# Patient Record
Sex: FEMALE | Race: WHITE | Employment: OTHER | URBAN - NONMETROPOLITAN AREA
[De-identification: names, ages, dates, MRNs, and addresses within clinical notes are randomized per-mention and may not be internally consistent; named-entity substitution may affect disease eponyms.]

---

## 2017-01-09 ENCOUNTER — HOSPITAL ENCOUNTER (OUTPATIENT)
Dept: WOMENS IMAGING | Age: 62
Discharge: HOME | End: 2017-01-09
Payer: COMMERCIAL

## 2017-01-09 DIAGNOSIS — Z12.31 ENCOUNTER FOR SCREENING MAMMOGRAM FOR BREAST CANCER: ICD-10-CM

## 2017-01-09 DIAGNOSIS — Z78.0 POSTMENOPAUSAL: ICD-10-CM

## 2017-01-09 PROCEDURE — 77080 DXA BONE DENSITY AXIAL: CPT

## 2017-01-09 PROCEDURE — 77063 BREAST TOMOSYNTHESIS BI: CPT

## 2018-01-10 ENCOUNTER — HOSPITAL ENCOUNTER (OUTPATIENT)
Dept: WOMENS IMAGING | Age: 63
Discharge: HOME | End: 2018-01-10
Payer: COMMERCIAL

## 2018-01-10 DIAGNOSIS — Z12.39 BREAST CANCER SCREENING: ICD-10-CM

## 2018-01-10 PROCEDURE — 77063 BREAST TOMOSYNTHESIS BI: CPT

## 2018-03-26 ENCOUNTER — HOSPITAL ENCOUNTER (OUTPATIENT)
Dept: ULTRASOUND IMAGING | Facility: HOSPITAL | Age: 63
Discharge: HOME | End: 2018-03-26
Admitting: NURSE PRACTITIONER

## 2018-03-26 ENCOUNTER — TRANSCRIBE ORDERS (OUTPATIENT)
Dept: ADMINISTRATIVE | Facility: HOSPITAL | Age: 63
End: 2018-03-26

## 2018-03-26 DIAGNOSIS — I82.402 ACUTE EMBOLISM AND THOMBOS UNSP DEEP VEINS OF L LOW EXTREM (HCC): Primary | ICD-10-CM

## 2018-03-26 DIAGNOSIS — I82.402 ACUTE EMBOLISM AND THOMBOS UNSP DEEP VEINS OF L LOW EXTREM (HCC): ICD-10-CM

## 2018-03-26 PROCEDURE — 93971 EXTREMITY STUDY: CPT

## 2019-01-14 ENCOUNTER — HOSPITAL ENCOUNTER (OUTPATIENT)
Dept: WOMENS IMAGING | Age: 64
Discharge: HOME | End: 2019-01-14
Payer: COMMERCIAL

## 2019-01-14 DIAGNOSIS — Z12.31 ENCOUNTER FOR SCREENING MAMMOGRAM FOR MALIGNANT NEOPLASM OF BREAST: ICD-10-CM

## 2019-01-14 PROCEDURE — 77063 BREAST TOMOSYNTHESIS BI: CPT

## 2019-03-25 ENCOUNTER — TELEPHONE (OUTPATIENT)
Dept: GASTROENTEROLOGY | Age: 64
End: 2019-03-25

## 2019-05-15 ASSESSMENT — ENCOUNTER SYMPTOMS
SORE THROAT: 0
ABDOMINAL PAIN: 0
DIARRHEA: 0
COUGH: 0
BACK PAIN: 0
RECTAL PAIN: 0
VOMITING: 0
SHORTNESS OF BREATH: 0
ABDOMINAL DISTENTION: 0
NAUSEA: 0
VOICE CHANGE: 0
CHEST TIGHTNESS: 0
BLOOD IN STOOL: 0

## 2019-05-15 NOTE — PROGRESS NOTES
and light-headedness. Hematological: Negative for adenopathy. Does not bruise/bleed easily. All other systems reviewed and are negative. Objective:   Physical Exam   Constitutional: She is oriented to person, place, and time. She appears well-developed and well-nourished. No distress. /77   Pulse 59   Ht 5' 5\" (1.651 m)   Wt 135 lb (61.2 kg)   SpO2 98%   BMI 22.47 kg/m²      Eyes: Conjunctivae are normal. No scleral icterus. Neck: No tracheal deviation present. Cardiovascular: Normal rate and regular rhythm. Exam reveals no gallop and no friction rub. No murmur heard. Pulmonary/Chest: Effort normal and breath sounds normal. No respiratory distress. She has no wheezes. She has no rhonchi. She has no rales. Abdominal: Soft. Normal appearance and bowel sounds are normal. She exhibits no distension and no mass. There is no hepatomegaly. There is no tenderness. There is no rebound and no guarding. Musculoskeletal: She exhibits no edema. Neurological: She is alert and oriented to person, place, and time. She has normal strength. Skin: Skin is warm, dry and intact. No cyanosis. No pallor. Psychiatric: She has a normal mood and affect. Her behavior is normal. Thought content normal. Cognition and memory are normal.       Assessment:      1. Dysphagia, unspecified type    2. Heartburn    3. Screening for colon cancer          Plan:      Schedule colonoscopy and endoscopy     Instruct on bowel prep. Nothing to eat or drink after midnight the day of the exam.  Unable to drive for 24 hours after the procedure. No aspirin or nonsteroidal anti-inflammatories for 5 days before procedure. I have discussed the benefits, alternatives, and risks (including bleeding, perforation and death)  for pursuing Endoscopy (EGD/Colonscopy/EUS/ERCP) with the patient and they are willing to continue.  We also discussed the need for anesthesia, IV access, proper dietary changes, medication changes if necessary, and need for bowel prep (if ordered) prior to their Endoscopic procedure. They are aware they must have someone accompany them to their scheduled procedure to drive them home - they agree to the above and are willing to continue.       Plan for anesthesia: MAC  no reported complications

## 2019-05-20 ENCOUNTER — OFFICE VISIT (OUTPATIENT)
Dept: GASTROENTEROLOGY | Age: 64
End: 2019-05-20
Payer: MEDICARE

## 2019-05-20 VITALS
SYSTOLIC BLOOD PRESSURE: 130 MMHG | WEIGHT: 135 LBS | DIASTOLIC BLOOD PRESSURE: 77 MMHG | BODY MASS INDEX: 22.49 KG/M2 | HEART RATE: 59 BPM | OXYGEN SATURATION: 98 % | HEIGHT: 65 IN

## 2019-05-20 DIAGNOSIS — R12 HEARTBURN: ICD-10-CM

## 2019-05-20 DIAGNOSIS — R13.10 DYSPHAGIA, UNSPECIFIED TYPE: Primary | ICD-10-CM

## 2019-05-20 DIAGNOSIS — Z12.11 SCREENING FOR COLON CANCER: ICD-10-CM

## 2019-05-20 PROCEDURE — 1036F TOBACCO NON-USER: CPT | Performed by: NURSE PRACTITIONER

## 2019-05-20 PROCEDURE — G8420 CALC BMI NORM PARAMETERS: HCPCS | Performed by: NURSE PRACTITIONER

## 2019-05-20 PROCEDURE — 3017F COLORECTAL CA SCREEN DOC REV: CPT | Performed by: NURSE PRACTITIONER

## 2019-05-20 PROCEDURE — G8427 DOCREV CUR MEDS BY ELIG CLIN: HCPCS | Performed by: NURSE PRACTITIONER

## 2019-05-20 PROCEDURE — 99204 OFFICE O/P NEW MOD 45 MIN: CPT | Performed by: NURSE PRACTITIONER

## 2019-05-20 ASSESSMENT — ENCOUNTER SYMPTOMS: CONSTIPATION: 1

## 2019-09-13 ENCOUNTER — ANESTHESIA EVENT (OUTPATIENT)
Dept: OPERATING ROOM | Age: 64
End: 2019-09-13

## 2019-09-19 ENCOUNTER — APPOINTMENT (OUTPATIENT)
Dept: OPERATING ROOM | Age: 64
End: 2019-09-19

## 2019-09-19 ENCOUNTER — HOSPITAL ENCOUNTER (OUTPATIENT)
Age: 64
Setting detail: OUTPATIENT SURGERY
Discharge: HOME | End: 2019-09-19
Attending: INTERNAL MEDICINE | Admitting: INTERNAL MEDICINE
Payer: MEDICARE

## 2019-09-19 ENCOUNTER — HOSPITAL ENCOUNTER (OUTPATIENT)
Age: 64
Setting detail: SPECIMEN
Discharge: HOME | End: 2019-09-19
Payer: MEDICARE

## 2019-09-19 ENCOUNTER — ANESTHESIA (OUTPATIENT)
Dept: OPERATING ROOM | Age: 64
End: 2019-09-19

## 2019-09-19 VITALS — OXYGEN SATURATION: 91 % | SYSTOLIC BLOOD PRESSURE: 95 MMHG | DIASTOLIC BLOOD PRESSURE: 48 MMHG

## 2019-09-19 VITALS
SYSTOLIC BLOOD PRESSURE: 112 MMHG | OXYGEN SATURATION: 100 % | BODY MASS INDEX: 22.49 KG/M2 | TEMPERATURE: 97.9 F | HEART RATE: 47 BPM | RESPIRATION RATE: 16 BRPM | DIASTOLIC BLOOD PRESSURE: 66 MMHG | HEIGHT: 65 IN | WEIGHT: 135 LBS

## 2019-09-19 PROCEDURE — G8907 PT DOC NO EVENTS ON DISCHARG: HCPCS

## 2019-09-19 PROCEDURE — 45385 COLONOSCOPY W/LESION REMOVAL: CPT

## 2019-09-19 PROCEDURE — G8918 PT W/O PREOP ORDER IV AB PRO: HCPCS

## 2019-09-19 PROCEDURE — 88342 IMHCHEM/IMCYTCHM 1ST ANTB: CPT

## 2019-09-19 PROCEDURE — 43239 EGD BIOPSY SINGLE/MULTIPLE: CPT | Performed by: INTERNAL MEDICINE

## 2019-09-19 PROCEDURE — 45385 COLONOSCOPY W/LESION REMOVAL: CPT | Performed by: INTERNAL MEDICINE

## 2019-09-19 PROCEDURE — 43239 EGD BIOPSY SINGLE/MULTIPLE: CPT

## 2019-09-19 PROCEDURE — 88305 TISSUE EXAM BY PATHOLOGIST: CPT

## 2019-09-19 RX ORDER — OMEPRAZOLE 40 MG/1
40 CAPSULE, DELAYED RELEASE ORAL
Qty: 90 CAPSULE | Refills: 1 | Status: SHIPPED | OUTPATIENT
Start: 2019-09-19 | End: 2019-11-18

## 2019-09-19 RX ORDER — SODIUM CHLORIDE 9 MG/ML
INJECTION, SOLUTION INTRAVENOUS CONTINUOUS
Status: DISCONTINUED | OUTPATIENT
Start: 2019-09-19 | End: 2019-09-19 | Stop reason: HOSPADM

## 2019-09-19 RX ORDER — PROPOFOL 10 MG/ML
INJECTION, EMULSION INTRAVENOUS PRN
Status: DISCONTINUED | OUTPATIENT
Start: 2019-09-19 | End: 2019-09-19 | Stop reason: SDUPTHER

## 2019-09-19 RX ORDER — LIDOCAINE HYDROCHLORIDE 10 MG/ML
INJECTION, SOLUTION INFILTRATION; PERINEURAL PRN
Status: DISCONTINUED | OUTPATIENT
Start: 2019-09-19 | End: 2019-09-19 | Stop reason: SDUPTHER

## 2019-09-19 RX ADMIN — LIDOCAINE HYDROCHLORIDE 40 MG: 10 INJECTION, SOLUTION INFILTRATION; PERINEURAL at 14:42

## 2019-09-19 RX ADMIN — PROPOFOL 450 MG: 10 INJECTION, EMULSION INTRAVENOUS at 14:42

## 2019-09-19 RX ADMIN — SODIUM CHLORIDE: 9 INJECTION, SOLUTION INTRAVENOUS at 13:13

## 2019-09-19 SDOH — HEALTH STABILITY: MENTAL HEALTH: HOW OFTEN DO YOU HAVE A DRINK CONTAINING ALCOHOL?: NEVER

## 2019-09-19 ASSESSMENT — PAIN SCALES - GENERAL
PAINLEVEL_OUTOF10: 0
PAINLEVEL_OUTOF10: 0

## 2019-09-19 NOTE — OP NOTE
Patient: Siddharth Leaver: 1955  [facility] Rec#: [id number] Acc#: [id number]   Primary Care Provider Elisabeth Hanson MD    Date of Procedure:  9/19/2019    Endoscopist: Nadya Barber MD    Referring Provider: Elisabeth Hanson MD, MICHAEL Lozoya    Operation Performed: Colonoscopy with hot snare polypectomy    Indications: screening    Anesthesia:  Sedation was administered by anesthesia who monitored the patient during the procedure. I met with Anson Amato prior to procedure. We discussed the procedure itself, and I have discussed the risks of endoscopy (including-- but not limited to-- pain, discomfort, bleeding potentially requiring second endoscopic procedure and/or blood transfusion, organ perforation requiring operative repair, damage to organs near the colon, infection, aspiration, cardiopulmonary/allergic reaction), benefits, indications to endoscopy. Additionally, we discussed options other than colonoscopy. The patient expressed understanding. All questions answered. The patient decided to proceed with the procedure. Signed informed consent was placed on the chart. Blood Loss: minimal    Withdrawal time: > 6 min  Bowel Prep: adequate     Complications: no immediate complications    DESCRIPTION OF PROCEDURE:     A time out was performed. After written informed consent was obtained, the patient was placed in the left lateral position. The perianal area was inspected, and a digital rectal exam was performed. A rectal exam was performed: normal tone, no palpable lesions. At this point, a forward viewing Olympus colonoscope was inserted into the anus and carefully advanced to the cecum. The cecum was identified by the ileocecal valve and the appendiceal orifice. The colonoscope was then slowly withdrawn with careful inspection of the mucosa in a linear and circumferential fashion. The scope was retroflexed in the rectum.  Suction was utilized during the procedure to remove as much air

## 2019-11-18 ENCOUNTER — OFFICE VISIT (OUTPATIENT)
Dept: GASTROENTEROLOGY | Age: 64
End: 2019-11-18
Payer: MEDICARE

## 2019-11-18 VITALS
BODY MASS INDEX: 22.82 KG/M2 | HEIGHT: 65 IN | WEIGHT: 137 LBS | SYSTOLIC BLOOD PRESSURE: 110 MMHG | OXYGEN SATURATION: 98 % | DIASTOLIC BLOOD PRESSURE: 80 MMHG | HEART RATE: 69 BPM

## 2019-11-18 DIAGNOSIS — K29.70 GASTRITIS DETERMINED BY BIOPSY: ICD-10-CM

## 2019-11-18 DIAGNOSIS — Z86.010 HISTORY OF ADENOMATOUS POLYP OF COLON: ICD-10-CM

## 2019-11-18 DIAGNOSIS — K21.00 REFLUX ESOPHAGITIS: Primary | ICD-10-CM

## 2019-11-18 PROCEDURE — G8420 CALC BMI NORM PARAMETERS: HCPCS | Performed by: NURSE PRACTITIONER

## 2019-11-18 PROCEDURE — 4040F PNEUMOC VAC/ADMIN/RCVD: CPT | Performed by: NURSE PRACTITIONER

## 2019-11-18 PROCEDURE — G8427 DOCREV CUR MEDS BY ELIG CLIN: HCPCS | Performed by: NURSE PRACTITIONER

## 2019-11-18 PROCEDURE — G8399 PT W/DXA RESULTS DOCUMENT: HCPCS | Performed by: NURSE PRACTITIONER

## 2019-11-18 PROCEDURE — G8484 FLU IMMUNIZE NO ADMIN: HCPCS | Performed by: NURSE PRACTITIONER

## 2019-11-18 PROCEDURE — 1036F TOBACCO NON-USER: CPT | Performed by: NURSE PRACTITIONER

## 2019-11-18 PROCEDURE — 1123F ACP DISCUSS/DSCN MKR DOCD: CPT | Performed by: NURSE PRACTITIONER

## 2019-11-18 PROCEDURE — 3017F COLORECTAL CA SCREEN DOC REV: CPT | Performed by: NURSE PRACTITIONER

## 2019-11-18 PROCEDURE — 99213 OFFICE O/P EST LOW 20 MIN: CPT | Performed by: NURSE PRACTITIONER

## 2019-11-18 PROCEDURE — 1090F PRES/ABSN URINE INCON ASSESS: CPT | Performed by: NURSE PRACTITIONER

## 2019-11-18 RX ORDER — M-VIT,TX,IRON,MINS/CALC/FOLIC 27MG-0.4MG
1 TABLET ORAL DAILY
COMMUNITY

## 2019-11-18 RX ORDER — MULTIVIT-MIN/IRON/FOLIC ACID/K 18-600-40
2000 CAPSULE ORAL DAILY
COMMUNITY

## 2019-11-18 RX ORDER — CALCIUM CARBONATE 500(1250)
500 TABLET ORAL DAILY
COMMUNITY

## 2019-11-18 RX ORDER — MAGNESIUM OXIDE 400 MG/1
1200 TABLET ORAL DAILY
COMMUNITY
End: 2021-05-20 | Stop reason: ALTCHOICE

## 2019-11-18 RX ORDER — MULTIVIT WITH MINERALS/LUTEIN
1000 TABLET ORAL DAILY
COMMUNITY

## 2019-11-18 ASSESSMENT — ENCOUNTER SYMPTOMS
NAUSEA: 0
TROUBLE SWALLOWING: 0
COUGH: 0
CONSTIPATION: 1
CHEST TIGHTNESS: 0
DIARRHEA: 0
BLOOD IN STOOL: 0
ABDOMINAL PAIN: 0
SHORTNESS OF BREATH: 0
BACK PAIN: 0
VOICE CHANGE: 0
ABDOMINAL DISTENTION: 0
VOMITING: 0
RECTAL PAIN: 0
SORE THROAT: 0

## 2020-02-28 ENCOUNTER — HOSPITAL ENCOUNTER (OUTPATIENT)
Dept: WOMENS IMAGING | Age: 65
Discharge: HOME | End: 2020-02-28
Payer: MEDICARE

## 2020-02-28 PROCEDURE — 77063 BREAST TOMOSYNTHESIS BI: CPT

## 2020-03-04 ENCOUNTER — TRANSCRIBE ORDERS (OUTPATIENT)
Dept: ADMINISTRATIVE | Facility: HOSPITAL | Age: 65
End: 2020-03-04

## 2020-03-04 DIAGNOSIS — Z13.820 SPECIAL SCREENING FOR OSTEOPOROSIS: Primary | ICD-10-CM

## 2020-04-24 ENCOUNTER — OFFICE VISIT (OUTPATIENT)
Dept: URGENT CARE | Age: 65
End: 2020-04-24
Payer: MEDICARE

## 2020-04-24 ENCOUNTER — HOSPITAL ENCOUNTER (OUTPATIENT)
Dept: VASCULAR LAB | Age: 65
Discharge: HOME | End: 2020-04-24
Payer: MEDICARE

## 2020-04-24 ENCOUNTER — TELEPHONE (OUTPATIENT)
Dept: URGENT CARE | Age: 65
End: 2020-04-24

## 2020-04-24 VITALS
DIASTOLIC BLOOD PRESSURE: 79 MMHG | OXYGEN SATURATION: 97 % | WEIGHT: 129 LBS | RESPIRATION RATE: 16 BRPM | SYSTOLIC BLOOD PRESSURE: 125 MMHG | BODY MASS INDEX: 21.49 KG/M2 | HEART RATE: 60 BPM | TEMPERATURE: 98.7 F | HEIGHT: 65 IN

## 2020-04-24 PROCEDURE — 1036F TOBACCO NON-USER: CPT | Performed by: NURSE PRACTITIONER

## 2020-04-24 PROCEDURE — G8420 CALC BMI NORM PARAMETERS: HCPCS | Performed by: NURSE PRACTITIONER

## 2020-04-24 PROCEDURE — 1090F PRES/ABSN URINE INCON ASSESS: CPT | Performed by: NURSE PRACTITIONER

## 2020-04-24 PROCEDURE — 1123F ACP DISCUSS/DSCN MKR DOCD: CPT | Performed by: NURSE PRACTITIONER

## 2020-04-24 PROCEDURE — G8399 PT W/DXA RESULTS DOCUMENT: HCPCS | Performed by: NURSE PRACTITIONER

## 2020-04-24 PROCEDURE — 93971 EXTREMITY STUDY: CPT

## 2020-04-24 PROCEDURE — G8427 DOCREV CUR MEDS BY ELIG CLIN: HCPCS | Performed by: NURSE PRACTITIONER

## 2020-04-24 PROCEDURE — 99213 OFFICE O/P EST LOW 20 MIN: CPT | Performed by: NURSE PRACTITIONER

## 2020-04-24 PROCEDURE — 4040F PNEUMOC VAC/ADMIN/RCVD: CPT | Performed by: NURSE PRACTITIONER

## 2020-04-24 PROCEDURE — 3017F COLORECTAL CA SCREEN DOC REV: CPT | Performed by: NURSE PRACTITIONER

## 2020-04-24 RX ORDER — TIZANIDINE 4 MG/1
4 TABLET ORAL EVERY 8 HOURS PRN
Qty: 15 TABLET | Refills: 0 | Status: SHIPPED | OUTPATIENT
Start: 2020-04-24 | End: 2021-05-20

## 2020-04-24 NOTE — PROGRESS NOTES
as needed (muscle spasm) 15 tablet 0    Cholecalciferol (VITAMIN D) 50 MCG (2000 UT) CAPS capsule Take 2,000 Units by mouth daily      Ascorbic Acid (VITAMIN C) 1000 MG tablet Take 1,000 mg by mouth daily      Multiple Vitamins-Minerals (THERAPEUTIC MULTIVITAMIN-MINERALS) tablet Take 1 tablet by mouth daily      calcium carbonate (OSCAL) 500 MG TABS tablet Take 500 mg by mouth daily      magnesium oxide (MAG-OX) 400 MG tablet Take 1,200 mg by mouth daily      LECITHIN-19 PO Take 1,300 mg by mouth 2 times daily      Flaxseed Oil OIL Take 1,550 mg by mouth 2 times daily      OMEGA-3 KRILL OIL PO Take 350 mg by mouth daily      NONFORMULARY For Hormone       No current facility-administered medications for this visit. No Known Allergies    Health Maintenance   Topic Date Due    Hepatitis C screen  1955    HIV screen  05/25/1969    DTaP/Tdap/Td vaccine (1 - Tdap) 05/25/1973    Cervical cancer screen  05/25/1975    Lipid screen  05/25/1994    Shingles Vaccine (1 of 2) 05/25/2004    Pneumococcal 65+ years Vaccine (1 of 1 - PPSV23) 05/25/2019    Annual Wellness Visit (AWV)  06/23/2019    Flu vaccine (Season Ended) 09/01/2020    Breast cancer screen  02/28/2022    Colon cancer screen colonoscopy  09/19/2024    DEXA (modify frequency per FRAX score)  Completed    Hepatitis A vaccine  Aged Out    Hepatitis B vaccine  Aged Out    Hib vaccine  Aged Out    Meningococcal (ACWY) vaccine  Aged Out       Subjective:     Review of Systems   Musculoskeletal:        Pain in left leg         :Objective      Physical Exam  Vitals signs and nursing note reviewed. Constitutional:       Appearance: Normal appearance. Musculoskeletal:      Left knee: She exhibits normal range of motion. No tenderness found. Left upper leg: She exhibits no bony tenderness, no swelling, no edema, no deformity and no laceration. Left lower leg: She exhibits no swelling, no deformity and no laceration. No edema. There are no Patient Instructions on file for this visit.       Electronically signed by MICHAEL Lemus on 4/24/2020 at 1:31 PM

## 2021-04-27 ENCOUNTER — HOSPITAL ENCOUNTER (OUTPATIENT)
Dept: WOMENS IMAGING | Age: 66
Discharge: HOME | End: 2021-04-27
Payer: MEDICARE

## 2021-04-27 DIAGNOSIS — Z12.31 BREAST CANCER SCREENING BY MAMMOGRAM: ICD-10-CM

## 2021-04-27 PROCEDURE — 77063 BREAST TOMOSYNTHESIS BI: CPT

## 2021-05-18 RX ORDER — HYOSCYAMINE SULFATE 0.12 MG/ML
LIQUID ORAL EVERY 4 HOURS PRN
COMMUNITY
End: 2021-05-20

## 2021-05-20 ENCOUNTER — OFFICE VISIT (OUTPATIENT)
Dept: GASTROENTEROLOGY | Age: 66
End: 2021-05-20
Payer: MEDICARE

## 2021-05-20 VITALS
HEIGHT: 65 IN | BODY MASS INDEX: 21.83 KG/M2 | HEART RATE: 69 BPM | DIASTOLIC BLOOD PRESSURE: 80 MMHG | OXYGEN SATURATION: 99 % | WEIGHT: 131 LBS | SYSTOLIC BLOOD PRESSURE: 120 MMHG

## 2021-05-20 DIAGNOSIS — K59.00 CONSTIPATION, UNSPECIFIED CONSTIPATION TYPE: ICD-10-CM

## 2021-05-20 DIAGNOSIS — R10.30 LOWER ABDOMINAL PAIN: Primary | ICD-10-CM

## 2021-05-20 PROCEDURE — G8420 CALC BMI NORM PARAMETERS: HCPCS | Performed by: NURSE PRACTITIONER

## 2021-05-20 PROCEDURE — 3017F COLORECTAL CA SCREEN DOC REV: CPT | Performed by: NURSE PRACTITIONER

## 2021-05-20 PROCEDURE — 4040F PNEUMOC VAC/ADMIN/RCVD: CPT | Performed by: NURSE PRACTITIONER

## 2021-05-20 PROCEDURE — G8427 DOCREV CUR MEDS BY ELIG CLIN: HCPCS | Performed by: NURSE PRACTITIONER

## 2021-05-20 PROCEDURE — 1123F ACP DISCUSS/DSCN MKR DOCD: CPT | Performed by: NURSE PRACTITIONER

## 2021-05-20 PROCEDURE — 99214 OFFICE O/P EST MOD 30 MIN: CPT | Performed by: NURSE PRACTITIONER

## 2021-05-20 PROCEDURE — 1090F PRES/ABSN URINE INCON ASSESS: CPT | Performed by: NURSE PRACTITIONER

## 2021-05-20 PROCEDURE — G8399 PT W/DXA RESULTS DOCUMENT: HCPCS | Performed by: NURSE PRACTITIONER

## 2021-05-20 PROCEDURE — 1036F TOBACCO NON-USER: CPT | Performed by: NURSE PRACTITIONER

## 2021-05-20 ASSESSMENT — ENCOUNTER SYMPTOMS
DIARRHEA: 0
BLOOD IN STOOL: 0
SHORTNESS OF BREATH: 0
CONSTIPATION: 1
COUGH: 0
NAUSEA: 0
CHOKING: 0
ABDOMINAL PAIN: 1
TROUBLE SWALLOWING: 0
VOMITING: 0
ABDOMINAL DISTENTION: 0
ANAL BLEEDING: 0
RECTAL PAIN: 0

## 2021-05-20 NOTE — PATIENT INSTRUCTIONS
Increasing Your Fiber Intake   What is fiber? Fiber is the portion of plant foods that cannot be digested. There are two kinds of fiber, both of which are keys to a healthy diet and a healthy digestive system:   - Soluble fiber aids in bulking and moving food through the gut. It forms a gel when mixed with liquid. - Insoluble fiber does not mix with liquids and passes through the GI tract mostly intact. It is sometimes called \"roughage. \"     Why do I need to eat it? Fiber has many important roles:   - Helps maintain regular bowel movements. More fiber can improve both diarrhea and constipation.   - Reduces the risk of developing hemorrhoids. - Lowers LDL or \"bad\" cholesterol levels, which lowers risk of heart disease.   - Regulates blood sugar levels in people with diabetes. - Provides a feeling of fullness and may help with weight loss. How much do I need? The Academy of Nutrition and Dietetics recommends:   - For women, 25 grams per day under age 48 and 21 grams per day over age 48. - For men, 38 grams per day under age 48 and 30 grams per day over age 48. What foods are the best sources? Plant foods contain fiber, but some more than others. Best choices are:   - Whole grains and high fiber cereals. - Dried beans and legumes. - Fruits and vegetables, especially raw. What about fiber supplements? If you need to add more fiber than you can get in your diet, consider:   - Type of Fiber: The major brands of fiber supplements (Metamucil®, Konsyl®, Citrucel®, Benefiber®, Fibercon®) all use soluble fiber and work in the same way. - Flavorings and Mixing: Many of the powdered brands have added flavoring and are mixed with just water. Other varieties are \"clear\" and can be added to numerous beverages and food items. Some brands also offer a \"wafer\" form. It's up to you! Tip: Increasing the fiber in your diet gradually may help minimize bloating and discomfort.  Be sure to drink plenty of fluids as you increase your fiber intake.         Fruits  Serving size  Total fiber (grams)    Pear  1 medium  5.1    Figs, dried  2 medium  3.7    Blueberries  1 cup  3.5    Apple, with skin  1 medium  4.4    Strawberries  1 cup  3.3    Peaches, dried  3 halves  3.2    Orange  1 medium  3.1    Apricots, dried  10 halves  2.6    Raisins  1.5-ounce box  1.6    Grains, cereal & pasta  Serving size  Total fiber (grams)    Spaghetti, whole-wheat  1 cup  6.3    Bran flakes  3/4 cup  5.1    Oatmeal  1 cup  4.0    Bread, rye  1 slice  1.9    Bread, whole-wheat  1 slice  1.9    Bread, mixed-grain  1 slice  1.7    Bread, cracked-wheat  1 slice  1.4

## 2021-05-20 NOTE — PROGRESS NOTES
Subjective:     Patient ID: Ann Irene is a 76 y.o. female  PCP: Carine Pacheco MD  Referring Provider: Caesar Trejo MD    HPI  Patient presents to the office today with the following complaints: Abdominal Pain      Pt referred for c/o abdominal pain and constipation. Constipation is chronic, however worsening with abdominal pain. Severe, sharp lower abdominal pain began on April 15th while she was out of town. She reported to local ER, CT negative per patient. She reports BM daily, stools in small amounts, hard. Pain has improved, however, continues to have issues at times. She denies any blood in stool. She has tried and failed Miralax and Linzess. She was previously using herbal medication for constipation prior to the episode of severe pain. Last EGD 9/2019 - esophagitis, gastritis  Last Colonoscopy 9/2019 - TA x 1  Denies any family history of colon cancer or colon polyps    Assessment:     1. Lower abdominal pain    2. Constipation, unspecified constipation type            Plan:   - Trial of Trulance, samples provided  - Follow up in 6 weeks or sooner if needed  - High Fiber Diet  - Increase water intake  - Consider Colonoscopy should symptoms persist or new develop    Orders  No orders of the defined types were placed in this encounter. Medications  No orders of the defined types were placed in this encounter.         Patient History:     Past Medical History:   Diagnosis Date    Cervical spine pain     GERD (gastroesophageal reflux disease)     History of blood transfusion     Hypercholesteremia     Liver mass     Mitral valve disorder     Neuropathy     PONV (postoperative nausea and vomiting)     Postmenopausal osteoporosis        Past Surgical History:   Procedure Laterality Date    BREAST BIOPSY      aretha neg    BUNIONECTOMY Left     COLONOSCOPY  2003    Dr BATES    COLONOSCOPY  2013    Shiben: neg. inadequate prep    COLONOSCOPY N/A 9/19/2019    Dr Anusha Ribera Al-melanosis coli, moderate sized hemorrhoids-Tubular AP (-) dysplasia    EGD      HYSTERECTOMY      OVARY REMOVAL      UPPER GASTROINTESTINAL ENDOSCOPY N/A 2019    Dr NADER Melendez-moderately severe esophagitis, gastritis       Family History   Problem Relation Age of Onset    Lung Cancer Father     Heart Disease Mother     Celiac Disease Brother     Colon Cancer Neg Hx     Colon Polyps Neg Hx     Esophageal Cancer Neg Hx     Liver Cancer Neg Hx     Liver Disease Neg Hx     Rectal Cancer Neg Hx     Stomach Cancer Neg Hx        Social History     Socioeconomic History    Marital status:      Spouse name: None    Number of children: None    Years of education: None    Highest education level: None   Occupational History    None   Tobacco Use    Smoking status: Former Smoker     Quit date:      Years since quittin.4    Smokeless tobacco: Never Used   Vaping Use    Vaping Use: Never used   Substance and Sexual Activity    Alcohol use: Never    Drug use: Never    Sexual activity: None   Other Topics Concern    None   Social History Narrative    None     Social Determinants of Health     Financial Resource Strain:     Difficulty of Paying Living Expenses:    Food Insecurity:     Worried About Running Out of Food in the Last Year:     [address] in the Last Year:    Transportation Needs:     Lack of Transportation (Medical):      Lack of Transportation (Non-Medical):    Physical Activity:     Days of Exercise per Week:     Minutes of Exercise per Session:    Stress:     Feeling of Stress :    Social Connections:     Frequency of Communication with Friends and Family:     Frequency of Social Gatherings with Friends and Family:     Attends [employer] Services:     Active Member of Clubs or Organizations:     Attends Club or Organization Meetings:     Marital Status:    Intimate Partner Violence:     Fear of Current or Ex-Partner:     Emotionally Abused:     Physically Abused:     Sexually Abused:        Current Outpatient Medications   Medication Sig Dispense Refill    COLLAGEN PO Take 500 mg by mouth 2 times daily      Coenzyme Q10 (COQ10 PO) Take 200 mg by mouth daily      NONFORMULARY daily Estradiol benzonate bulk powder      EYEBRIGHT PO Take by mouth daily      L-Lysine 1000 MG TABS Take by mouth daily      lactulose (CEPHULAC) 20 g packet Take 20 g by mouth as needed      Red Yeast Rice Extract 600 MG TABS Take by mouth daily      TESTOSTERONE COMPOUNDING KIT TD Place onto the skin      Cholecalciferol (VITAMIN D) 50 MCG (2000 UT) CAPS capsule Take 2,000 Units by mouth daily      Ascorbic Acid (VITAMIN C) 1000 MG tablet Take 1,000 mg by mouth daily      Multiple Vitamins-Minerals (THERAPEUTIC MULTIVITAMIN-MINERALS) tablet Take 1 tablet by mouth daily      calcium carbonate (OSCAL) 500 MG TABS tablet Take 500 mg by mouth daily      LECITHIN-19 PO Take 1,300 mg by mouth 2 times daily      Flaxseed Oil OIL Take 1,550 mg by mouth 2 times daily      OMEGA-3 KRILL OIL PO Take 350 mg by mouth daily      NONFORMULARY nightly For Hormone Progesterone 50mg       No current facility-administered medications for this visit. No Known Allergies    Review of Systems   Constitutional: Negative for activity change, appetite change, fatigue, fever and unexpected weight change. HENT: Negative for trouble swallowing. Respiratory: Negative for cough, choking and shortness of breath. Cardiovascular: Negative for chest pain. Gastrointestinal: Positive for abdominal pain and constipation. Negative for abdominal distention, anal bleeding, blood in stool, diarrhea, nausea, rectal pain and vomiting. Allergic/Immunologic: Negative for food allergies. All other systems reviewed and are negative. Objective:     /80   Pulse 69   Ht 5' 5\" (1.651 m)   Wt 131 lb (59.4 kg)   SpO2 99%   BMI 21.80 kg/m²     Physical Exam  Vitals reviewed. Constitutional:       General: She is not in acute distress. Appearance: She is well-developed. HENT:      Head: Normocephalic and atraumatic. Right Ear: External ear normal.      Left Ear: External ear normal.      Nose: Nose normal.      Comments: Mask on     Mouth/Throat:      Comments: Mask on  Eyes:      Conjunctiva/sclera: Conjunctivae normal.      Pupils: Pupils are equal, round, and reactive to light. Cardiovascular:      Rate and Rhythm: Normal rate and regular rhythm. Heart sounds: Normal heart sounds. No murmur heard. No friction rub. No gallop. Pulmonary:      Effort: Pulmonary effort is normal. No respiratory distress. Breath sounds: Normal breath sounds. Abdominal:      General: Bowel sounds are normal. There is no distension. Palpations: Abdomen is soft. There is no mass. Tenderness: There is no abdominal tenderness. There is no guarding or rebound. Musculoskeletal:         General: Normal range of motion. Cervical back: Normal range of motion and neck supple. Skin:     General: Skin is warm and dry. Findings: No rash. Nails: There is no clubbing. Neurological:      Mental Status: She is alert and oriented to person, place, and time. Gait: Gait normal.   Psychiatric:         Behavior: Behavior normal.         Thought Content:  Thought content normal.

## 2021-07-13 ENCOUNTER — HOSPITAL ENCOUNTER (OUTPATIENT)
Dept: MRI IMAGING | Age: 66
Discharge: HOME | End: 2021-07-13
Payer: MEDICARE

## 2021-07-13 DIAGNOSIS — R16.0 HEPATOMEGALY: ICD-10-CM

## 2021-07-13 PROCEDURE — 6360000004 HC RX CONTRAST MEDICATION: Performed by: FAMILY MEDICINE

## 2021-07-13 PROCEDURE — A9577 INJ MULTIHANCE: HCPCS | Performed by: FAMILY MEDICINE

## 2021-07-13 PROCEDURE — 74183 MRI ABD W/O CNTR FLWD CNTR: CPT

## 2021-07-13 RX ADMIN — GADOBENATE DIMEGLUMINE 12 ML: 529 INJECTION, SOLUTION INTRAVENOUS at 10:12

## 2021-07-14 ENCOUNTER — OFFICE VISIT (OUTPATIENT)
Dept: GASTROENTEROLOGY | Age: 66
End: 2021-07-14
Payer: MEDICARE

## 2021-07-14 VITALS
HEIGHT: 66 IN | HEART RATE: 68 BPM | WEIGHT: 134.4 LBS | SYSTOLIC BLOOD PRESSURE: 118 MMHG | OXYGEN SATURATION: 99 % | BODY MASS INDEX: 21.6 KG/M2 | DIASTOLIC BLOOD PRESSURE: 66 MMHG

## 2021-07-14 DIAGNOSIS — K58.1 IRRITABLE BOWEL SYNDROME WITH CONSTIPATION: Primary | ICD-10-CM

## 2021-07-14 PROCEDURE — 1123F ACP DISCUSS/DSCN MKR DOCD: CPT | Performed by: NURSE PRACTITIONER

## 2021-07-14 PROCEDURE — 4040F PNEUMOC VAC/ADMIN/RCVD: CPT | Performed by: NURSE PRACTITIONER

## 2021-07-14 PROCEDURE — G8420 CALC BMI NORM PARAMETERS: HCPCS | Performed by: NURSE PRACTITIONER

## 2021-07-14 PROCEDURE — 3017F COLORECTAL CA SCREEN DOC REV: CPT | Performed by: NURSE PRACTITIONER

## 2021-07-14 PROCEDURE — G8399 PT W/DXA RESULTS DOCUMENT: HCPCS | Performed by: NURSE PRACTITIONER

## 2021-07-14 PROCEDURE — 99213 OFFICE O/P EST LOW 20 MIN: CPT | Performed by: NURSE PRACTITIONER

## 2021-07-14 PROCEDURE — 1036F TOBACCO NON-USER: CPT | Performed by: NURSE PRACTITIONER

## 2021-07-14 PROCEDURE — G8427 DOCREV CUR MEDS BY ELIG CLIN: HCPCS | Performed by: NURSE PRACTITIONER

## 2021-07-14 PROCEDURE — 1090F PRES/ABSN URINE INCON ASSESS: CPT | Performed by: NURSE PRACTITIONER

## 2021-07-14 ASSESSMENT — ENCOUNTER SYMPTOMS
COUGH: 0
BLOOD IN STOOL: 0
TROUBLE SWALLOWING: 0
NAUSEA: 0
ANAL BLEEDING: 0
SHORTNESS OF BREATH: 0
VOMITING: 0
DIARRHEA: 0
ABDOMINAL DISTENTION: 0
ABDOMINAL PAIN: 0
CONSTIPATION: 1
CHOKING: 0
RECTAL PAIN: 0

## 2021-07-14 NOTE — PROGRESS NOTES
Subjective:     Patient ID: Kathleen Bonilla is a 78 y.o. female  PCP: Maximiliano Carey MD  Referring Provider: No ref. provider found    HPI  Patient presents to the office today with the following complaints: Follow-up        Pt here for follow up. Last seen in May for c/o constipation and abdominal pain. Trial of Trulance was recommended. Today, she reports symptoms are about the same. She only used samples of Trulance for 5 days. She is currently taking Lactulose with very little improvement. However, she has not experienced any more severe episodes of constipation and lower abdominal pain. LAST HPI 5/20/2021  Pt referred for c/o abdominal pain and constipation. Constipation is chronic, however worsening with abdominal pain. Severe, sharp lower abdominal pain began on April 15th while she was out of town. She reported to local ER, CT negative per patient. She reports BM daily, stools in small amounts, hard. Pain has improved, however, continues to have issues at times. She denies any blood in stool. She has tried and failed Miralax and Linzess. She was previously using herbal medication for constipation prior to the episode of severe pain. Last EGD 9/2019 - esophagitis, gastritis  Last Colonoscopy 9/2019 - TA x 1  Denies any family history of colon cancer or colon polyps      Assessment:     1. Irritable bowel syndrome with constipation            Plan:   - Trial of Trulance, samples provided today. Script sent to [address] to stop Lactulose while taking Trulance  - Follow up 3 months or sooner if needed  - Call with any questions or concerns  - High Fiber Diet   - Consider colonoscopy should symptoms not improve      Orders  No orders of the defined types were placed in this encounter.     Medications  Orders Placed This Encounter   Medications    Plecanatide 3 MG TABS     Sig: Take 3 mg by mouth daily     Dispense:  30 tablet     Refill:  2         Patient History:     Past Medical History:   Diagnosis Date    Cervical spine pain     GERD (gastroesophageal reflux disease)     History of blood transfusion     Hypercholesteremia     Liver mass     Mitral valve disorder     Neuropathy     PONV (postoperative nausea and vomiting)     Postmenopausal osteoporosis        Past Surgical History:   Procedure Laterality Date    BREAST BIOPSY      aretha neg    BUNIONECTOMY Left     COLONOSCOPY      Dr BATES    COLONOSCOPY      Shiben: neg. inadequate prep    COLONOSCOPY N/A 2019    Dr NADER Melendez-melanosis coli, moderate sized hemorrhoids-Tubular AP (-) dysplasia    EGD      HYSTERECTOMY      OVARY REMOVAL      UPPER GASTROINTESTINAL ENDOSCOPY N/A 2019    Dr NADER Melendez-moderately severe esophagitis, gastritis       Family History   Problem Relation Age of Onset    Lung Cancer Father     Heart Disease Mother     Celiac Disease Brother     Colon Cancer Neg Hx     Colon Polyps Neg Hx     Esophageal Cancer Neg Hx     Liver Cancer Neg Hx     Liver Disease Neg Hx     Rectal Cancer Neg Hx     Stomach Cancer Neg Hx        Social History     Socioeconomic History    Marital status:      Spouse name: None    Number of children: None    Years of education: None    Highest education level: None   Occupational History    None   Tobacco Use    Smoking status: Former Smoker     Quit date:      Years since quittin.5    Smokeless tobacco: Never Used   Vaping Use    Vaping Use: Never used   Substance and Sexual Activity    Alcohol use: Never    Drug use: Never    Sexual activity: None   Other Topics Concern    None   Social History Narrative    None     Social Determinants of Health     Financial Resource Strain:     Difficulty of Paying Living Expenses:    Food Insecurity:     Worried About Running Out of Food in the Last Year:     [address] in the Last Year:    Transportation Needs:     Lack of Transportation (Medical):      Lack of Transportation (Non-Medical):    Physical Activity:     Days of Exercise per Week:     Minutes of Exercise per Session:    Stress:     Feeling of Stress :    Social Connections:     Frequency of Communication with Friends and Family:     Frequency of Social Gatherings with Friends and Family:     Attends [employer] Services:     Active Member of Clubs or Organizations:     Attends Club or Organization Meetings:     Marital Status:    Intimate Partner Violence:     Fear of Current or Ex-Partner:     Emotionally Abused:     Physically Abused:     Sexually Abused:        Current Outpatient Medications   Medication Sig Dispense Refill    MILK THISTLE PO Take by mouth daily      Plecanatide 3 MG TABS Take 3 mg by mouth daily 30 tablet 2    COLLAGEN PO Take 500 mg by mouth 2 times daily      Coenzyme Q10 (COQ10 PO) Take 200 mg by mouth daily      NONFORMULARY daily Estradiol benzonate bulk powder      EYEBRIGHT PO Take by mouth daily      L-Lysine 1000 MG TABS Take by mouth daily      lactulose (CEPHULAC) 20 g packet Take 20 g by mouth as needed      Red Yeast Rice Extract 600 MG TABS Take by mouth daily      TESTOSTERONE COMPOUNDING KIT TD Place onto the skin daily       Cholecalciferol (VITAMIN D) 50 MCG (2000 UT) CAPS capsule Take 2,000 Units by mouth daily      Ascorbic Acid (VITAMIN C) 1000 MG tablet Take 1,000 mg by mouth daily      Multiple Vitamins-Minerals (THERAPEUTIC MULTIVITAMIN-MINERALS) tablet Take 1 tablet by mouth daily      calcium carbonate (OSCAL) 500 MG TABS tablet Take 500 mg by mouth daily      LECITHIN-19 PO Take 1,300 mg by mouth 2 times daily      Flaxseed Oil OIL Take 1,550 mg by mouth 2 times daily      OMEGA-3 KRILL OIL PO Take 350 mg by mouth daily      NONFORMULARY nightly For Hormone Progesterone 50mg       No current facility-administered medications for this visit.        No Known Allergies    Review of Systems   Constitutional: Negative for activity change, appetite change, fatigue, fever and unexpected weight change. HENT: Negative for trouble swallowing. Respiratory: Negative for cough, choking and shortness of breath. Cardiovascular: Negative for chest pain. Gastrointestinal: Positive for constipation. Negative for abdominal distention, abdominal pain, anal bleeding, blood in stool, diarrhea, nausea, rectal pain and vomiting. Allergic/Immunologic: Negative for food allergies. All other systems reviewed and are negative. Objective:     /66   Pulse 68   Ht 5' 5.5\" (1.664 m)   Wt 134 lb 6.4 oz (61 kg)   SpO2 99%   BMI 22.03 kg/m²     Physical Exam  Vitals reviewed. Constitutional:       General: She is not in acute distress. Appearance: She is well-developed. Eyes:      General:         Right eye: No discharge. Left eye: No discharge. Cardiovascular:      Rate and Rhythm: Normal rate and regular rhythm. Heart sounds: No murmur heard. Pulmonary:      Effort: Pulmonary effort is normal. No respiratory distress. Breath sounds: Normal breath sounds. Abdominal:      General: Bowel sounds are normal. There is no distension. Palpations: Abdomen is soft. There is no mass. Tenderness: There is no abdominal tenderness. There is no guarding or rebound. Musculoskeletal:         General: Normal range of motion. Cervical back: Normal range of motion. Skin:     General: Skin is warm and dry. Neurological:      Mental Status: She is alert and oriented to person, place, and time.    Psychiatric:         Behavior: Behavior normal.

## 2021-10-27 ENCOUNTER — OFFICE VISIT (OUTPATIENT)
Dept: GASTROENTEROLOGY | Age: 66
End: 2021-10-27
Payer: MEDICARE

## 2021-10-27 VITALS
BODY MASS INDEX: 22.99 KG/M2 | DIASTOLIC BLOOD PRESSURE: 70 MMHG | HEART RATE: 60 BPM | WEIGHT: 138 LBS | SYSTOLIC BLOOD PRESSURE: 120 MMHG | OXYGEN SATURATION: 99 % | HEIGHT: 65 IN

## 2021-10-27 DIAGNOSIS — K58.1 IRRITABLE BOWEL SYNDROME WITH CONSTIPATION: Primary | ICD-10-CM

## 2021-10-27 PROCEDURE — 1036F TOBACCO NON-USER: CPT | Performed by: NURSE PRACTITIONER

## 2021-10-27 PROCEDURE — G8427 DOCREV CUR MEDS BY ELIG CLIN: HCPCS | Performed by: NURSE PRACTITIONER

## 2021-10-27 PROCEDURE — 1123F ACP DISCUSS/DSCN MKR DOCD: CPT | Performed by: NURSE PRACTITIONER

## 2021-10-27 PROCEDURE — G8420 CALC BMI NORM PARAMETERS: HCPCS | Performed by: NURSE PRACTITIONER

## 2021-10-27 PROCEDURE — 4040F PNEUMOC VAC/ADMIN/RCVD: CPT | Performed by: NURSE PRACTITIONER

## 2021-10-27 PROCEDURE — G8484 FLU IMMUNIZE NO ADMIN: HCPCS | Performed by: NURSE PRACTITIONER

## 2021-10-27 PROCEDURE — 1090F PRES/ABSN URINE INCON ASSESS: CPT | Performed by: NURSE PRACTITIONER

## 2021-10-27 PROCEDURE — 3017F COLORECTAL CA SCREEN DOC REV: CPT | Performed by: NURSE PRACTITIONER

## 2021-10-27 PROCEDURE — G8399 PT W/DXA RESULTS DOCUMENT: HCPCS | Performed by: NURSE PRACTITIONER

## 2021-10-27 PROCEDURE — 99213 OFFICE O/P EST LOW 20 MIN: CPT | Performed by: NURSE PRACTITIONER

## 2021-10-27 ASSESSMENT — ENCOUNTER SYMPTOMS
DIARRHEA: 0
ABDOMINAL DISTENTION: 0
TROUBLE SWALLOWING: 0
NAUSEA: 0
CONSTIPATION: 1
RECTAL PAIN: 0
ABDOMINAL PAIN: 0
VOMITING: 0
COUGH: 0
CHOKING: 0
ANAL BLEEDING: 0
BLOOD IN STOOL: 0
SHORTNESS OF BREATH: 0

## 2021-10-27 NOTE — PROGRESS NOTES
Family History   Problem Relation Age of Onset    Lung Cancer Father     Heart Disease Mother     Celiac Disease Brother     Liver Cancer Maternal Grandfather     Colon Cancer Neg Hx     Colon Polyps Neg Hx     Esophageal Cancer Neg Hx     Liver Disease Neg Hx     Rectal Cancer Neg Hx     Stomach Cancer Neg Hx        Social History     Socioeconomic History    Marital status:      Spouse name: None    Number of children: None    Years of education: None    Highest education level: None   Occupational History    None   Tobacco Use    Smoking status: Former Smoker     Quit date: 1974     Years since quittin.8    Smokeless tobacco: Never Used   Vaping Use    Vaping Use: Never used   Substance and Sexual Activity    Alcohol use: Never    Drug use: Never    Sexual activity: None   Other Topics Concern    None   Social History Narrative    None     Social Determinants of Health     Financial Resource Strain:     Difficulty of Paying Living Expenses:    Food Insecurity:     Worried About Running Out of Food in the Last Year:     Ran Out of Food in the Last Year:    Transportation Needs:     Lack of Transportation (Medical):      Lack of Transportation (Non-Medical):    Physical Activity:     Days of Exercise per Week:     Minutes of Exercise per Session:    Stress:     Feeling of Stress :    Social Connections:     Frequency of Communication with Friends and Family:     Frequency of Social Gatherings with Friends and Family:     Attends Scientologist Services:     Active Member of Clubs or Organizations:     Attends Club or Organization Meetings:     Marital Status:    Intimate Partner Violence:     Fear of Current or Ex-Partner:     Emotionally Abused:     Physically Abused:     Sexually Abused:        Current Outpatient Medications   Medication Sig Dispense Refill    Plecanatide 3 MG TABS Take 3 mg by mouth daily 90 tablet 3    MILK THISTLE PO Take by mouth daily  COLLAGEN PO Take 500 mg by mouth 2 times daily      Coenzyme Q10 (COQ10 PO) Take 200 mg by mouth daily      NONFORMULARY daily Estradiol benzonate bulk powder      EYEBRIGHT PO Take by mouth daily      L-Lysine 1000 MG TABS Take by mouth daily      Red Yeast Rice Extract 600 MG TABS Take by mouth daily      TESTOSTERONE COMPOUNDING KIT TD Place onto the skin daily       Cholecalciferol (VITAMIN D) 50 MCG (2000 UT) CAPS capsule Take 2,000 Units by mouth daily      Ascorbic Acid (VITAMIN C) 1000 MG tablet Take 1,000 mg by mouth daily      Multiple Vitamins-Minerals (THERAPEUTIC MULTIVITAMIN-MINERALS) tablet Take 1 tablet by mouth daily      calcium carbonate (OSCAL) 500 MG TABS tablet Take 500 mg by mouth daily      LECITHIN-19 PO Take 1,300 mg by mouth 2 times daily      Flaxseed Oil OIL Take 1,550 mg by mouth 2 times daily      OMEGA-3 KRILL OIL PO Take 350 mg by mouth daily      NONFORMULARY nightly For Hormone Progesterone 50mg       No current facility-administered medications for this visit. No Known Allergies    Review of Systems   Constitutional: Negative for activity change, appetite change, fatigue, fever and unexpected weight change. HENT: Negative for trouble swallowing. Respiratory: Negative for cough, choking and shortness of breath. Cardiovascular: Negative for chest pain. Gastrointestinal: Positive for constipation (improved). Negative for abdominal distention, abdominal pain, anal bleeding, blood in stool, diarrhea, nausea, rectal pain and vomiting. Allergic/Immunologic: Negative for food allergies. All other systems reviewed and are negative. Objective:     /70 (Site: Left Upper Arm)   Pulse 60   Ht 5' 5\" (1.651 m)   Wt 138 lb (62.6 kg)   SpO2 99%   BMI 22.96 kg/m²     Physical Exam  Vitals reviewed. Constitutional:       General: She is not in acute distress. Appearance: She is well-developed.    Eyes:      General:         Right eye: No

## 2021-10-27 NOTE — PATIENT INSTRUCTIONS
Miralax one teaspoon daily mixed as directed until you are having daily bowel movement. If no bm for 4 days increase to two teaspoons daily and gradually increase every 3-4 days until desired bowel movement is achieved. If you do not have bm for more than 4 days please use magnesim citrate, 1/2 bottle, to prevent becoming obstructed but continue to use miralax daily. If your stools become too loose or too frequent on daily dose you may reduce the amount taken daily. Make reductions gradually, every 3-4 days. Adjust dose, more or less, as needed for desired bm. You should have a soft bm at least every 3 days. Miralax is safe and effective for long term relief of chronic constipation. Drink 6-8 glasses of water daily. Regular exercise encouraged. High fiber diet recommended. Increasing Your Fiber Intake   What is fiber? Fiber is the portion of plant foods that cannot be digested. There are two kinds of fiber, both of which are keys to a healthy diet and a healthy digestive system:   - Soluble fiber aids in bulking and moving food through the gut. It forms a gel when mixed with liquid. - Insoluble fiber does not mix with liquids and passes through the GI tract mostly intact. It is sometimes called \"roughage. \"     Why do I need to eat it? Fiber has many important roles:   - Helps maintain regular bowel movements. More fiber can improve both diarrhea and constipation.   - Reduces the risk of developing hemorrhoids. - Lowers LDL or \"bad\" cholesterol levels, which lowers risk of heart disease.   - Regulates blood sugar levels in people with diabetes. - Provides a feeling of fullness and may help with weight loss. How much do I need? The Academy of Nutrition and Dietetics recommends:   - For women, 25 grams per day under age 48 and 21 grams per day over age 48. - For men, 38 grams per day under age 48 and 30 grams per day over age 48. What foods are the best sources?    Plant foods contain fiber, but some more than others. Best choices are:   - Whole grains and high fiber cereals. - Dried beans and legumes. - Fruits and vegetables, especially raw. What about fiber supplements? If you need to add more fiber than you can get in your diet, consider:   - Type of Fiber: The major brands of fiber supplements (Metamucil®, Konsyl®, Citrucel®, Benefiber®, Fibercon®) all use soluble fiber and work in the same way. - Flavorings and Mixing: Many of the powdered brands have added flavoring and are mixed with just water. Other varieties are \"clear\" and can be added to numerous beverages and food items. Some brands also offer a \"wafer\" form. It's up to you! Tip: Increasing the fiber in your diet gradually may help minimize bloating and discomfort. Be sure to drink plenty of fluids as you increase your fiber intake.         Fruits  Serving size  Total fiber (grams)    Pear  1 medium  5.1    Figs, dried  2 medium  3.7    Blueberries  1 cup  3.5    Apple, with skin  1 medium  4.4    Strawberries  1 cup  3.3    Peaches, dried  3 halves  3.2    Orange  1 medium  3.1    Apricots, dried  10 halves  2.6    Raisins  1.5-ounce box  1.6    Grains, cereal & pasta  Serving size  Total fiber (grams)    Spaghetti, whole-wheat  1 cup  6.3    Bran flakes  3/4 cup  5.1    Oatmeal  1 cup  4.0    Bread, rye  1 slice  1.9    Bread, whole-wheat  1 slice  1.9    Bread, mixed-grain  1 slice  1.7    Bread, cracked-wheat  1 slice  1.4

## 2022-02-07 ENCOUNTER — TELEPHONE (OUTPATIENT)
Dept: GASTROENTEROLOGY | Age: 67
End: 2022-02-07

## 2022-02-11 ENCOUNTER — APPOINTMENT (OUTPATIENT)
Dept: CT IMAGING | Age: 67
End: 2022-02-11
Payer: MEDICARE

## 2022-02-11 ENCOUNTER — TELEPHONE (OUTPATIENT)
Dept: GASTROENTEROLOGY | Age: 67
End: 2022-02-11

## 2022-02-11 ENCOUNTER — HOSPITAL ENCOUNTER (EMERGENCY)
Age: 67
Discharge: HOME | End: 2022-02-11
Attending: EMERGENCY MEDICINE
Payer: MEDICARE

## 2022-02-11 VITALS
TEMPERATURE: 97.8 F | SYSTOLIC BLOOD PRESSURE: 120 MMHG | HEART RATE: 58 BPM | RESPIRATION RATE: 18 BRPM | DIASTOLIC BLOOD PRESSURE: 76 MMHG | OXYGEN SATURATION: 94 %

## 2022-02-11 DIAGNOSIS — K59.09 CHRONIC CONSTIPATION: ICD-10-CM

## 2022-02-11 DIAGNOSIS — R10.9 ABDOMINAL CRAMPING: Primary | ICD-10-CM

## 2022-02-11 LAB
ALBUMIN SERPL-MCNC: 4.1 G/DL (ref 3.5–5.2)
ALP BLD-CCNC: 81 U/L (ref 35–104)
ALT SERPL-CCNC: 11 U/L (ref 5–33)
ANION GAP SERPL CALCULATED.3IONS-SCNC: 11 MMOL/L (ref 7–19)
AST SERPL-CCNC: 16 U/L (ref 5–32)
BACTERIA: NEGATIVE /HPF
BASOPHILS ABSOLUTE: 0 K/UL (ref 0–0.2)
BASOPHILS RELATIVE PERCENT: 0.2 % (ref 0–1)
BILIRUB SERPL-MCNC: 0.3 MG/DL (ref 0.2–1.2)
BILIRUBIN URINE: NEGATIVE
BLOOD, URINE: NEGATIVE
BUN BLDV-MCNC: 21 MG/DL (ref 8–23)
CALCIUM SERPL-MCNC: 9.1 MG/DL (ref 8.8–10.2)
CHLORIDE BLD-SCNC: 105 MMOL/L (ref 98–111)
CLARITY: ABNORMAL
CO2: 24 MMOL/L (ref 22–29)
COLOR: YELLOW
CREAT SERPL-MCNC: 0.8 MG/DL (ref 0.5–0.9)
CRYSTALS, UA: ABNORMAL /HPF
EOSINOPHILS ABSOLUTE: 0 K/UL (ref 0–0.6)
EOSINOPHILS RELATIVE PERCENT: 0.1 % (ref 0–5)
EPITHELIAL CELLS, UA: 6 /HPF (ref 0–5)
GFR AFRICAN AMERICAN: >59
GFR NON-AFRICAN AMERICAN: >60
GLUCOSE BLD-MCNC: 123 MG/DL (ref 74–109)
GLUCOSE URINE: NEGATIVE MG/DL
HCT VFR BLD CALC: 40.2 % (ref 37–47)
HEMOGLOBIN: 13.3 G/DL (ref 12–16)
HYALINE CASTS: 6 /HPF (ref 0–8)
IMMATURE GRANULOCYTES #: 0.1 K/UL
KETONES, URINE: ABNORMAL MG/DL
LEUKOCYTE ESTERASE, URINE: ABNORMAL
LIPASE: 56 U/L (ref 13–60)
LYMPHOCYTES ABSOLUTE: 0.9 K/UL (ref 1.1–4.5)
LYMPHOCYTES RELATIVE PERCENT: 8.3 % (ref 20–40)
MCH RBC QN AUTO: 33.8 PG (ref 27–31)
MCHC RBC AUTO-ENTMCNC: 33.1 G/DL (ref 33–37)
MCV RBC AUTO: 102.3 FL (ref 81–99)
MONOCYTES ABSOLUTE: 0.3 K/UL (ref 0–0.9)
MONOCYTES RELATIVE PERCENT: 2.8 % (ref 0–10)
NEUTROPHILS ABSOLUTE: 9.2 K/UL (ref 1.5–7.5)
NEUTROPHILS RELATIVE PERCENT: 88.1 % (ref 50–65)
NITRITE, URINE: NEGATIVE
PDW BLD-RTO: 11.5 % (ref 11.5–14.5)
PH UA: 5 (ref 5–8)
PLATELET # BLD: 199 K/UL (ref 130–400)
PMV BLD AUTO: 10.7 FL (ref 9.4–12.3)
POTASSIUM REFLEX MAGNESIUM: 4.3 MMOL/L (ref 3.5–5)
PROTEIN UA: NEGATIVE MG/DL
RBC # BLD: 3.93 M/UL (ref 4.2–5.4)
RBC UA: 3 /HPF (ref 0–4)
SODIUM BLD-SCNC: 140 MMOL/L (ref 136–145)
SPECIFIC GRAVITY UA: 1.02 (ref 1–1.03)
TOTAL PROTEIN: 7 G/DL (ref 6.6–8.7)
UROBILINOGEN, URINE: 0.2 E.U./DL
WBC # BLD: 10.4 K/UL (ref 4.8–10.8)
WBC UA: 3 /HPF (ref 0–5)

## 2022-02-11 PROCEDURE — 80053 COMPREHEN METABOLIC PANEL: CPT

## 2022-02-11 PROCEDURE — 99284 EMERGENCY DEPT VISIT MOD MDM: CPT

## 2022-02-11 PROCEDURE — 85025 COMPLETE CBC W/AUTO DIFF WBC: CPT

## 2022-02-11 PROCEDURE — 74177 CT ABD & PELVIS W/CONTRAST: CPT

## 2022-02-11 PROCEDURE — 36415 COLL VENOUS BLD VENIPUNCTURE: CPT

## 2022-02-11 PROCEDURE — 81001 URINALYSIS AUTO W/SCOPE: CPT

## 2022-02-11 PROCEDURE — 6360000004 HC RX CONTRAST MEDICATION: Performed by: EMERGENCY MEDICINE

## 2022-02-11 PROCEDURE — 83690 ASSAY OF LIPASE: CPT

## 2022-02-11 RX ORDER — DOCUSATE SODIUM 100 MG/1
100 CAPSULE, LIQUID FILLED ORAL 2 TIMES DAILY
Qty: 20 CAPSULE | Refills: 0 | Status: SHIPPED | OUTPATIENT
Start: 2022-02-11 | End: 2022-02-21

## 2022-02-11 RX ORDER — WHEAT DEXTRIN 3 G/3.8 G
4 POWDER (GRAM) ORAL
Qty: 144 G | Refills: 0 | Status: SHIPPED | OUTPATIENT
Start: 2022-02-11 | End: 2022-02-28 | Stop reason: ALTCHOICE

## 2022-02-11 RX ORDER — POLYETHYLENE GLYCOL 3350 17 G/17G
17 POWDER, FOR SOLUTION ORAL 2 TIMES DAILY
Qty: 500 G | Refills: 0 | Status: SHIPPED | OUTPATIENT
Start: 2022-02-11 | End: 2022-02-14

## 2022-02-11 RX ORDER — HYOSCYAMINE SULFATE 0.12 MG/1
0.12 TABLET SUBLINGUAL
Qty: 30 EACH | Refills: 0 | Status: SHIPPED | OUTPATIENT
Start: 2022-02-11

## 2022-02-11 RX ADMIN — IOPAMIDOL 80 ML: 755 INJECTION, SOLUTION INTRAVENOUS at 09:13

## 2022-02-11 ASSESSMENT — ENCOUNTER SYMPTOMS
COUGH: 0
CONSTIPATION: 1
DIARRHEA: 0
EYE REDNESS: 0
NAUSEA: 1
ABDOMINAL PAIN: 1
VOMITING: 0
VOICE CHANGE: 0
EYE PAIN: 0
SHORTNESS OF BREATH: 0
RHINORRHEA: 0

## 2022-02-11 NOTE — ED PROVIDER NOTES
[facility] EMERGENCY DEPT  EMERGENCY DEPARTMENT ENCOUNTER      Pt Name: Ana Alanis  MRN: [id number]  [place] 1955  Date of evaluation: 2/11/2022  Provider: Jannette Mullins MD    CHIEF COMPLAINT       Chief Complaint   Patient presents with    Abdominal Pain     Lower abdominal pain, has had this before. Pain has resolved at this time         HISTORY OF PRESENT ILLNESS   (Location/Symptom, Timing/Onset,Context/Setting, Quality, Duration, Modifying Factors, Severity)  Note limiting factors. Ana Alanis is a 78 y.o. female who presents to the emergency department for evaluation after having an episode of severe generalized abdominal cramping this morning. States it started spontaneously around 4:00 this morning and lasted about 3 hours total.  Symptoms have improved by time of arrival here. Had some nausea but no vomiting. No associated diarrhea. Has chronic constipation with worsening recently. Has not noticed any blood in her stool. Still eating and drinking okay. States she had a similar episode last year while she was on vacation in [facility] and was evaluated with an unremarkable work-up at a hospital there. HPI    NursingNotes were reviewed. REVIEW OF SYSTEMS    (2-9 systems for level 4, 10 or more for level 5)     Review of Systems   Constitutional: Negative for fatigue and fever. HENT: Negative for congestion, rhinorrhea and voice change. Eyes: Negative for pain and redness. Respiratory: Negative for cough and shortness of breath. Cardiovascular: Negative for chest pain. Gastrointestinal: Positive for abdominal pain, constipation and nausea. Negative for diarrhea and vomiting. Endocrine: Negative. Genitourinary: Negative. Musculoskeletal: Negative for arthralgias and gait problem. Skin: Negative for rash and wound. Neurological: Negative for weakness and headaches. Hematological: Negative. Psychiatric/Behavioral: Negative.     All other systems reviewed and are negative. A complete review of systems was performed and is negative except as noted above in the HPI.        PAST MEDICAL HISTORY     Past Medical History:   Diagnosis Date    Cervical spine pain     GERD (gastroesophageal reflux disease)     History of blood transfusion     Hypercholesteremia     Liver mass     Mitral valve disorder     Neuropathy     PONV (postoperative nausea and vomiting)     Postmenopausal osteoporosis          SURGICAL HISTORY       Past Surgical History:   Procedure Laterality Date    BREAST BIOPSY      aretha neg    BUNIONECTOMY Left     COLONOSCOPY  2003    Dr BATES    COLONOSCOPY  2013    Shiben: neg. inadequate prep    COLONOSCOPY N/A 9/19/2019    Dr NADER Melendez-melanosis coli, moderate sized hemorrhoids-Tubular AP (-) dysplasia    EGD      HYSTERECTOMY      OVARY REMOVAL      UPPER GASTROINTESTINAL ENDOSCOPY N/A 9/19/2019    Dr NADER Melendez-moderately severe esophagitis, gastritis         CURRENT MEDICATIONS       Discharge Medication List as of 2/11/2022 10:19 AM      CONTINUE these medications which have NOT CHANGED    Details   Plecanatide 3 MG TABS Take 3 mg by mouth daily, Disp-90 tablet, R-3Normal      MILK THISTLE PO Take by mouth dailyHistorical Med      COLLAGEN PO Take 500 mg by mouth 2 times dailyHistorical Med      Coenzyme Q10 (COQ10 PO) Take 200 mg by mouth dailyHistorical Med      !! NONFORMULARY daily Estradiol benzonate bulk powderHistorical Med      EYEBRIGHT PO Take by mouth dailyHistorical Med      L-Lysine 1000 MG TABS Take by mouth dailyHistorical Med      Red Yeast Rice Extract 600 MG TABS Take by mouth dailyHistorical Med      TESTOSTERONE COMPOUNDING KIT TD Place onto the skin daily Historical Med      Cholecalciferol (VITAMIN D) 50 MCG (2000 UT) CAPS capsule Take 2,000 Units by mouth dailyHistorical Med      Ascorbic Acid (VITAMIN C) 1000 MG tablet Take 1,000 mg by mouth dailyHistorical Med      Multiple Vitamins-Minerals (THERAPEUTIC MULTIVITAMIN-MINERALS) tablet Take 1 tablet by mouth dailyHistorical Med      calcium carbonate (OSCAL) 500 MG TABS tablet Take 500 mg by mouth dailyHistorical Med      LECITHIN-19 PO Take 1,300 mg by mouth 2 times dailyHistorical Med      Flaxseed Oil OIL Take 1,550 mg by mouth 2 times dailyHistorical Med      OMEGA-3 KRILL OIL PO Take 350 mg by mouth dailyHistorical Med      !! NONFORMULARY nightly For Hormone Progesterone 50mgHistorical Med       !! - Potential duplicate medications found. Please discuss with provider. ALLERGIES     Patient has no known allergies. FAMILY HISTORY       Family History   Problem Relation Age of Onset    Lung Cancer Father     Heart Disease Mother     Celiac Disease Brother     Liver Cancer Maternal Grandfather     Colon Cancer Neg Hx     Colon Polyps Neg Hx     Esophageal Cancer Neg Hx     Liver Disease Neg Hx     Rectal Cancer Neg Hx     Stomach Cancer Neg Hx           SOCIAL HISTORY       Social History     Socioeconomic History    Marital status:      Spouse name: None    Number of children: None    Years of education: None    Highest education level: None   Occupational History    None   Tobacco Use    Smoking status: Former Smoker     Quit date:      Years since quittin.1    Smokeless tobacco: Never Used   Vaping Use    Vaping Use: Never used   Substance and Sexual Activity    Alcohol use: Never    Drug use: Never    Sexual activity: None   Other Topics Concern    None   Social History Narrative    None     Social Determinants of Health     Financial Resource Strain:     Difficulty of Paying Living Expenses: Not on file   Food Insecurity:     Worried About Running Out of Food in the Last Year: Not on file    Elizabeth of Food in the Last Year: Not on file   Transportation Needs:     Lack of Transportation (Medical): Not on file    Lack of Transportation (Non-Medical):  Not on file   Physical Activity:     Days of Exercise per Week: Not on file    Minutes of Exercise per Session: Not on file   Stress:     Feeling of Stress : Not on file   Social Connections:     Frequency of Communication with Friends and Family: Not on file    Frequency of Social Gatherings with Friends and Family: Not on file    Attends Jew Services: Not on file    Active Member of [address] [place] [address] [employer] or Organizations: Not on file    Attends Club or Organization Meetings: Not on file    Marital Status: Not on file   Intimate Partner Violence:     Fear of Current or Ex-Partner: Not on file    Emotionally Abused: Not on file    Physically Abused: Not on file    Sexually Abused: Not on file   Housing Stability:     Unable to Pay for Housing in the Last Year: Not on file    Number of [place] in the Last Year: Not on file    Unstable Housing in the Last Year: Not on file       SCREENINGS    Brenda Coma Scale  Eye Opening: Spontaneous  Best Verbal Response: Oriented  Best Motor Response: Obeys commands  Brenda Coma Scale Score: 15        PHYSICAL EXAM    (up to 7 for level 4, 8 or more for level 5)     ED Triage Vitals [02/11/22 0722]   BP Temp Temp src Pulse Resp SpO2 Height Weight   (!) 131/97 97.8 °F (36.6 °C) -- 78 18 95 % -- --       Physical Exam  Vitals and nursing note reviewed. Constitutional:       General: She is not in acute distress. Appearance: Normal appearance. She is well-developed. She is not diaphoretic. HENT:      Head: Normocephalic and atraumatic. Mouth/Throat:      Pharynx: No oropharyngeal exudate. Eyes:      General: No scleral icterus. Pupils: Pupils are equal, round, and reactive to light. Neck:      Trachea: No tracheal deviation. Cardiovascular:      Rate and Rhythm: Normal rate. Pulses: Normal pulses. Heart sounds: Normal heart sounds. Pulmonary:      Effort: Pulmonary effort is normal.      Breath sounds: Normal breath sounds. No stridor. No wheezing or rhonchi.    Abdominal:      General: There is no distension. Palpations: Abdomen is soft. Abdomen is not rigid. Tenderness: There is no abdominal tenderness. There is no guarding. Hernia: No hernia is present. Musculoskeletal:         General: No deformity. Cervical back: Normal range of motion. Skin:     General: Skin is warm and dry. Findings: No rash. Neurological:      Mental Status: She is alert and oriented to person, place, and time. Cranial Nerves: No cranial nerve deficit. Coordination: Coordination normal.   Psychiatric:         Behavior: Behavior normal.         DIAGNOSTIC RESULTS     EKG: All EKG's are interpreted by the Emergency Department Physician who either signs or Co-signs this chart in the absence of a cardiologist.        RADIOLOGY:   Non-plain film images such as CT, Ultrasound and MRI are read by the radiologist. Annika Bumps images are visualized and preliminarily interpreted by the emergency physician with the below findings:        Interpretation per the Radiologist below, if available at the time of this note:    CT ABDOMEN PELVIS W IV CONTRAST Additional Contrast? None   Final Result   Large volume stool throughout the colon. Correlate for constipation. No evidence of active bowel inflammation or bowel obstruction. Normal   appendix.    Signed by Dr David Paulino            ED BEDSIDE ULTRASOUND:   Performed by ED Physician - none    LABS:  Labs Reviewed   CBC WITH AUTO DIFFERENTIAL - Abnormal; Notable for the following components:       Result Value    RBC 3.93 (*)     .3 (*)     MCH 33.8 (*)     Neutrophils % 88.1 (*)     Lymphocytes % 8.3 (*)     Neutrophils Absolute 9.2 (*)     Lymphocytes Absolute 0.9 (*)     All other components within normal limits   COMPREHENSIVE METABOLIC PANEL W/ REFLEX TO MG FOR LOW K - Abnormal; Notable for the following components:    Glucose 123 (*)     All other components within normal limits   URINE RT REFLEX TO CULTURE - Abnormal; Notable for the following components:    Clarity, UA CLOUDY (*)     Ketones, Urine TRACE (*)     Leukocyte Esterase, Urine TRACE (*)     All other components within normal limits   MICROSCOPIC URINALYSIS - Abnormal; Notable for the following components:    Bacteria, UA NEGATIVE (*)     Crystals, UA NEG (*)     All other components within normal limits   LIPASE       All other labs were within normal range or not returned as of this dictation. Medications   iopamidol (ISOVUE-370) 76 % injection 80 mL (80 mLs IntraVENous Given 2/11/22 0913)       EMERGENCY DEPARTMENT COURSE and DIFFERENTIALDIAGNOSIS/MDM:   Vitals:    Vitals:    02/11/22 0900 02/11/22 0930 02/11/22 1000 02/11/22 1030   BP: (!) 115/57 133/68 121/68 120/76   Pulse: 60 59 60 58   Resp: 18 18 18 18   Temp:       SpO2: 96% 94% 95% 94%       MDM      Evaluation and work-up here revealed no signs of emergent or life-threatening pathology that would necessitate admission for further work-up or management at this time. Patient is felt to be stable for discharge home with return precautions for worsening of the condition or development of new concerning symptoms. Patient was encouraged to follow-up with their primary care doctor in the appropriate timeframe. Necessary prescriptions and information have been provided for treatment at home. Patient voices understanding and agreement with the plan. CONSULTS:  None    PROCEDURES:  Unless otherwise notedbelow, none     Procedures      FINAL IMPRESSION     1. Abdominal cramping    2.  Chronic constipation          DISPOSITION/PLAN   DISPOSITION Decision To Discharge 02/11/2022 09:54:45 AM      PATIENT REFERRED TO:  [address] [place] [address] EMERGENCY DEPT  [facility]  [phone]    If symptoms worsen    Deb Oliveira MD  [address] [phone]      As needed      DISCHARGE MEDICATIONS:  Discharge Medication List as of 2/11/2022 10:19 AM      START taking these medications    Details   Hyoscyamine Sulfate SL (LEVSIN/SL) 0.125 MG SUBL Place 0.125 mg under the tongue every 4-6 hours as needed (abdominal cramping/pain), Disp-30 each, R-0Normal      docusate sodium (COLACE) 100 MG capsule Take 1 capsule by mouth 2 times daily for 10 days, Disp-20 capsule, R-0Normal      polyethylene glycol (GLYCOLAX) 17 GM/SCOOP powder Take 17 g by mouth 2 times daily for 3 days, Disp-500 g, R-0Normal      Wheat Dextrin (BENEFIBER) POWD Take 4 g by mouth 3 times daily (with meals), Disp-144 g, R-0Normal                (Please note that portions of this note were completed with a voice recognition program.  Efforts were made to edit the dictations butoccasionally words are mis-transcribed.)    Alex Deal MD (electronically signed)  AttendingEmergency Physician          Alex Deal., MD  02/11/22 5631

## 2022-02-11 NOTE — ED NOTES
Bed: 13  Expected date:   Expected time:   Means of arrival:   Comments:  LURDES Cline, DANY  02/11/22 4364

## 2022-02-11 NOTE — TELEPHONE ENCOUNTER
Impression   Large volume stool throughout the colon. Correlate for constipation. No evidence of active bowel inflammation or bowel obstruction. Normal   appendix. Signed by Dr Leah Miranda visit [facility] aprn 10-27-21. Egd/Cln  9-19-19. No FU scheduled. Patient said she had to go to ED today 2-11-21 due to severe abdominal pain, she said they did a CT scan, see impression above. Patient said they gave her Rx for Levsin 0-125 mg but she said she is afraid this medication will increase her constipation and the CT Scan did show a large amount of stool in the CLN. Patient currently takes Trulance 3 mg daily and she said on the average she will have a BM maybe 3 times a week, said she did have a pretty good BM yesterday but it did not relieve the abdominal pain so she decided today to go to ED today. Patient is asking if [facility] can review her records from ED and make further recommendations. Currently using Trulance 3 mg daily plus ED recommends Colace daily and Miralax and Fiber, she feels this is a lot and just wants something that will work and clean her Colon out. I will forward to [facility] aprn to review.   barry

## 2022-02-14 NOTE — TELEPHONE ENCOUNTER
Recommend 1 bottle of Mag Citrate today, repeat in am if needed. Continue Trulance with adding Miralax daily, adjust dose as needed.   May need OV if symptoms persist

## 2022-02-14 NOTE — TELEPHONE ENCOUNTER
Thanks Afia, this patient has been notified by KAIDEN left @ 12:40 pm, I told her if problems persist to call back and get a FU scheduled to discuss with APRN.  Joshua reddy

## 2022-02-17 NOTE — TELEPHONE ENCOUNTER
2-17-22    Patient called back today stating she did one bottle of MC and did not feel like she got cleaned out, I told the patient that is why [address] recommended 2 bottle with 1 on the first day and the 2nd on the next day, she said she just misunderstood and did finally today take the other bottle of [address] and said it seemed to work really well, she will continue with the Trulance and the Miralax and Colace. If she continues with any more current issues she will call back for an OV to discuss as [address] recommends.   cma

## 2022-02-22 ENCOUNTER — TELEPHONE (OUTPATIENT)
Dept: GASTROENTEROLOGY | Age: 67
End: 2022-02-22

## 2022-02-22 DIAGNOSIS — R10.30 LOWER ABDOMINAL PAIN: Primary | ICD-10-CM

## 2022-02-22 NOTE — TELEPHONE ENCOUNTER
02-22-22 Patient called and LVM that she had also used an enema today. Called patient back  She did use a enema today and didn't do very much and it was small pieces. Her abdominal pain is better since she used the enema.        Routed to [facility]

## 2022-02-22 NOTE — TELEPHONE ENCOUNTER
Inez requests that nurse  return their call. The best time to reach her is Anytime. Patient having issue with Constipation she said the medication not working, Patient has appointment on Monday 2-28-22,please call patient @[phone]    Thank you. admission

## 2022-02-22 NOTE — TELEPHONE ENCOUNTER
02-22-22 Called patient   Apt with Ariel Yoder on Monday. A lot of trouble with Constipation. She done Mag Citrate last Tuesday and Thursday (02-17-22) had some diarrhea about still not very much. Went to [facility] on 02-11-22 for abd pain. Meds:   Trulance daily    Miralax 1 capful once daily   Colace 100 mg bid     Questions as to what she needs to do between now and Monday?        Routed Ariel Yoder

## 2022-02-23 ENCOUNTER — HOSPITAL ENCOUNTER (OUTPATIENT)
Dept: GENERAL RADIOLOGY | Age: 67
Discharge: HOME | End: 2022-02-23
Payer: MEDICARE

## 2022-02-23 DIAGNOSIS — R10.30 LOWER ABDOMINAL PAIN: ICD-10-CM

## 2022-02-23 PROCEDURE — 74018 RADEX ABDOMEN 1 VIEW: CPT

## 2022-02-28 ENCOUNTER — OFFICE VISIT (OUTPATIENT)
Dept: GASTROENTEROLOGY | Age: 67
End: 2022-02-28
Payer: MEDICARE

## 2022-02-28 VITALS
BODY MASS INDEX: 22.21 KG/M2 | HEART RATE: 61 BPM | OXYGEN SATURATION: 98 % | HEIGHT: 66 IN | SYSTOLIC BLOOD PRESSURE: 124 MMHG | DIASTOLIC BLOOD PRESSURE: 72 MMHG | WEIGHT: 138.2 LBS

## 2022-02-28 DIAGNOSIS — K58.1 IRRITABLE BOWEL SYNDROME WITH CONSTIPATION: Primary | ICD-10-CM

## 2022-02-28 DIAGNOSIS — Z11.52 ENCOUNTER FOR SCREENING FOR COVID-19: Primary | ICD-10-CM

## 2022-02-28 DIAGNOSIS — R10.30 LOWER ABDOMINAL PAIN: ICD-10-CM

## 2022-02-28 PROCEDURE — 1036F TOBACCO NON-USER: CPT | Performed by: NURSE PRACTITIONER

## 2022-02-28 PROCEDURE — 1123F ACP DISCUSS/DSCN MKR DOCD: CPT | Performed by: NURSE PRACTITIONER

## 2022-02-28 PROCEDURE — 4040F PNEUMOC VAC/ADMIN/RCVD: CPT | Performed by: NURSE PRACTITIONER

## 2022-02-28 PROCEDURE — G8399 PT W/DXA RESULTS DOCUMENT: HCPCS | Performed by: NURSE PRACTITIONER

## 2022-02-28 PROCEDURE — G8420 CALC BMI NORM PARAMETERS: HCPCS | Performed by: NURSE PRACTITIONER

## 2022-02-28 PROCEDURE — 3017F COLORECTAL CA SCREEN DOC REV: CPT | Performed by: NURSE PRACTITIONER

## 2022-02-28 PROCEDURE — 1090F PRES/ABSN URINE INCON ASSESS: CPT | Performed by: NURSE PRACTITIONER

## 2022-02-28 PROCEDURE — 99214 OFFICE O/P EST MOD 30 MIN: CPT | Performed by: NURSE PRACTITIONER

## 2022-02-28 PROCEDURE — G8484 FLU IMMUNIZE NO ADMIN: HCPCS | Performed by: NURSE PRACTITIONER

## 2022-02-28 PROCEDURE — G8427 DOCREV CUR MEDS BY ELIG CLIN: HCPCS | Performed by: NURSE PRACTITIONER

## 2022-02-28 RX ORDER — POLYETHYLENE GLYCOL 3350 17 G/17G
17 POWDER, FOR SOLUTION ORAL PRN
COMMUNITY

## 2022-02-28 RX ORDER — PLECANATIDE 3 MG/1
TABLET ORAL DAILY
COMMUNITY
End: 2022-02-28 | Stop reason: ALTCHOICE

## 2022-02-28 RX ORDER — LUBIPROSTONE 24 UG/1
24 CAPSULE, GELATIN COATED ORAL 2 TIMES DAILY WITH MEALS
Qty: 60 CAPSULE | Refills: 5 | Status: SHIPPED | OUTPATIENT
Start: 2022-02-28 | End: 2022-04-18 | Stop reason: ALTCHOICE

## 2022-02-28 ASSESSMENT — ENCOUNTER SYMPTOMS
ABDOMINAL DISTENTION: 0
SHORTNESS OF BREATH: 0
RECTAL PAIN: 0
VOMITING: 0
DIARRHEA: 0
COUGH: 0
CHOKING: 0
ABDOMINAL PAIN: 1
ANAL BLEEDING: 0
TROUBLE SWALLOWING: 0
BLOOD IN STOOL: 0
CONSTIPATION: 1
NAUSEA: 0

## 2022-02-28 NOTE — PATIENT INSTRUCTIONS
Schedule colonoscopy. No aspirin, ibuprofen, naproxen, fish oil or vitamin E for 5 days before procedure. Do not eat or drink after midnight the day of the procedure. Allowed medications can be taken with a small sip of water. Please review your prep instructions for allowed medications. You will not be able to drive for 24 hours after the procedure due to sedation. You must have a responsible adult, 25 year or older, to accompany you and drive you home the day of your procedure. If you are on blood thinners, clearance from the prescribing physician will be obtained before your procedure is scheduled. If it is determined it is not safe to hold these medications for a short time an alternative procedure for evaluation may be recommended. Risks of colonoscopy include, but are not limited to, perforation, bleeding, and infection, Risk of perforation and bleeding are increased if there is a polyp removed. Anesthesia risks will be reviewed with you before the procedure by a member of the anesthesia department. Your physician may also schedule a follow up appointment with the nurse practitioner to discuss pathology, symptoms or to check if you have had any problems related to your procedure. If you prefer not to return to the office after your procedure please discuss this with your physician on the day of your colonoscopy. The physician will talk with you and/or your family after the procedure is completed. Final recommendations are based on the pathologist report if biopsies or specimens are taken. If polyps are removed during the procedure they will be sent to a pathologist for analysis. Unless you have a follow up appointment scheduled, you will be notified by mail of the pathology results within 4 weeks. If you have not received results after 4 weeks you may call the office to obtain this information. For Colonoscopy:   You will be given specific directions regarding restrictions to diet and bowel prep instructions including laxatives. Please read these instructions one week prior to your scheduled procedure to ensure that you are prepared. If you have any questions regarding these instructions please call our office Mon through Fri from 8:00 am to 4:00 pm.     Follow prep instructions provided for bowel prep. Take all of the bowel prep as directed. If you are having problems with nausea, stop your prep for 30-45 min to allow the nausea to subside before resuming your prep. It is important to drink plenty of fluids throughout the day before taking your laxatives. This will help to protect your kidneys, prevent dehydration and maximize the effect of the bowel prep. Your diet before a colonoscopy bowel preparation is very important to ensure a successful colon exam. It is recommended to consider certain changes to your diet three to four days prior to the procedure. Remember that your bowels need to be completely empty for the exam.    What foods are good to eat? Cut down on heavy solid foods three to four days before the procedure and start introducing lighter meals to your diet. The following food suggestions are a good part of your diet before a colonoscopy bowel preparation.  Light meat that is easily digestible such as chicken (without the skin)    Potatoes without skin    Cheese    Eggs    A light meal of steamed white fish    Light clear soups    Foods and drinks to avoid  Avoid foods that contain too much fiber. Stay clear of dark colored beverages. They can stick to the walls of the digestive tract and make it difficult to differentiate from blood.  Some of these foods are:   Red meat, rice, nuts and vegetables    Milk, other milk based fluids and cream    Most fruit and puddings    Whole grain pasta    Cereals, bran and seeds    Colored beverages, especially those that are red or purple in color    Red colored Jell-O     On the day before the colonoscopy, continue to drink plenty of clear fluids. It is important   to keep yourself hydrated before the exam.     Please follow all instructions as provided for cleansing the bowel. Failure to have an adequately prepped colon may cause you to have incomplete exam with further testing required.      [email or web address]

## 2022-02-28 NOTE — PROGRESS NOTES
Subjective:     Patient ID: Yazmin Zeng is a 78 y.o. female  PCP: Jaqueline Jones MD  Referring Provider: No ref. provider found    HPI  Patient presents to the office today with the following complaints: Follow-up      Pt seen today for continued constipation. She has tried and failed Lactulose, Linzess, Miralax, and now Trulance. Last BM 2 days ago, stools were thin in caliber. Trulance worked in the beginning. Denies any blood in stool. Constipation worsening in the last few months. She was seen in ER on 2/11/2022 with c/o abdominal pain. Large amount of stool in colon. She is using OTC laxatives prn. BM every 1-2 days in small amounts. Last EGD 2019 - esophagitis, gastritis  Last Colonoscopy 9/2019 - TA x 1   Denies any family hx colon cancer or colon polyps    Assessment:     1. Irritable bowel syndrome with constipation    2. Lower abdominal pain            Plan:   - Trial of Amitiza 24 mcg po BID, samples provided  - Follow up in 6-8 weeks or sooner if needed  - Schedule colonoscopy  Instruct on bowel prep. Nothing to eat or drink after midnight the day of the exam.  Unable to drive for 24 hours after the procedure. No aspirin or nonsteroidal anti-inflammatories for 5 days before procedure. I have discussed the benefits, alternatives, and risks (including bleeding, perforation and death)  for pursuing Endoscopy (EGD/Colonscopy/EUS/ERCP) with the patient and they are willing to continue. We also discussed the need for anesthesia, IV access, proper dietary changes, medication changes if necessary, and need for bowel prep (if ordered) prior to their Endoscopic procedure. They are aware they must have someone accompany them to their scheduled procedure to drive them home - they agree to the above and are willing to continue. Orders  No orders of the defined types were placed in this encounter.     Medications  Orders Placed This Encounter   Medications    lubiprostone (AMITIZA) 24 MCG capsule     Sig: Take 1 capsule by mouth 2 times daily (with meals)     Dispense:  60 capsule     Refill:  5         Patient History:     Past Medical History:   Diagnosis Date    Cervical spine pain     GERD (gastroesophageal reflux disease)     History of blood transfusion     Hypercholesteremia     Liver mass     Mitral valve disorder     Neuropathy     PONV (postoperative nausea and vomiting)     Postmenopausal osteoporosis        Past Surgical History:   Procedure Laterality Date    BREAST BIOPSY      aretha neg    BUNIONECTOMY Left     COLONOSCOPY      Dr BATES    COLONOSCOPY      Shiben: neg. inadequate prep    COLONOSCOPY N/A 2019    Dr NADER Melendez-melanosis coli, moderate sized hemorrhoids-Tubular AP (-) dysplasia    EGD      HYSTERECTOMY      OVARY REMOVAL      UPPER GASTROINTESTINAL ENDOSCOPY N/A 2019    Dr NADER Melendez-moderately severe esophagitis, gastritis       Family History   Problem Relation Age of Onset    Lung Cancer Father     Heart Disease Mother     Celiac Disease Brother     Liver Cancer Maternal Grandfather     Colon Cancer Neg Hx     Colon Polyps Neg Hx     Esophageal Cancer Neg Hx     Liver Disease Neg Hx     Rectal Cancer Neg Hx     Stomach Cancer Neg Hx        Social History     Socioeconomic History    Marital status:      Spouse name: None    Number of children: None    Years of education: None    Highest education level: None   Occupational History    None   Tobacco Use    Smoking status: Former Smoker     Quit date:      Years since quittin.1    Smokeless tobacco: Never Used   Vaping Use    Vaping Use: Never used   Substance and Sexual Activity    Alcohol use: Never    Drug use: Never    Sexual activity: None   Other Topics Concern    None   Social History Narrative    None     Social Determinants of Health     Financial Resource Strain:     Difficulty of Paying Living Expenses: Not on file   Food Insecurity:     Worried About Running Out of Food in the Last Year: Not on file    Elizabeth of Food in the Last Year: Not on file   Transportation Needs:     Lack of Transportation (Medical): Not on file    Lack of Transportation (Non-Medical):  Not on file   Physical Activity:     Days of Exercise per Week: Not on file    Minutes of Exercise per Session: Not on file   Stress:     Feeling of Stress : Not on file   Social Connections:     Frequency of Communication with Friends and Family: Not on file    Frequency of Social Gatherings with Friends and Family: Not on file    Attends Evangelical Services: Not on file    Active Member of [address] [place] [address] [employer] or Organizations: Not on file    Attends Club or Organization Meetings: Not on file    Marital Status: Not on file   Intimate Partner Violence:     Fear of Current or Ex-Partner: Not on file    Emotionally Abused: Not on file    Physically Abused: Not on file    Sexually Abused: Not on file   Housing Stability:     Unable to Pay for Housing in the Last Year: Not on file    Number of [place] in the Last Year: Not on file    Unstable Housing in the Last Year: Not on file       Current Outpatient Medications   Medication Sig Dispense Refill    polyethylene glycol (MIRALAX) 17 g PACK packet Take 17 g by mouth as needed      lubiprostone (AMITIZA) 24 MCG capsule Take 1 capsule by mouth 2 times daily (with meals) 60 capsule 5    Hyoscyamine Sulfate SL (LEVSIN/SL) 0.125 MG SUBL Place 0.125 mg under the tongue every 4-6 hours as needed (abdominal cramping/pain) 30 each 0    MILK THISTLE PO Take by mouth daily      COLLAGEN PO Take 500 mg by mouth 2 times daily      Coenzyme Q10 (COQ10 PO) Take 200 mg by mouth daily      NONFORMULARY daily Estradiol benzonate bulk powder      EYEBRIGHT PO Take by mouth daily      L-Lysine 1000 MG TABS Take by mouth daily      Red Yeast Rice Extract 600 MG TABS Take by mouth daily      TESTOSTERONE COMPOUNDING KIT TD Place onto the skin daily       Cholecalciferol (VITAMIN D) 50 MCG (2000 UT) CAPS capsule Take 2,000 Units by mouth daily      Ascorbic Acid (VITAMIN C) 1000 MG tablet Take 1,000 mg by mouth daily      Multiple Vitamins-Minerals (THERAPEUTIC MULTIVITAMIN-MINERALS) tablet Take 1 tablet by mouth daily      calcium carbonate (OSCAL) 500 MG TABS tablet Take 500 mg by mouth daily      LECITHIN-19 PO Take 1,300 mg by mouth 2 times daily      Flaxseed Oil OIL Take 1,550 mg by mouth 2 times daily      OMEGA-3 KRILL OIL PO Take 350 mg by mouth daily      NONFORMULARY nightly For Hormone Progesterone 50mg       No current facility-administered medications for this visit. No Known Allergies    Review of Systems   Constitutional: Negative for activity change, appetite change, fatigue, fever and unexpected weight change. HENT: Negative for trouble swallowing. Respiratory: Negative for cough, choking and shortness of breath. Cardiovascular: Negative for chest pain. Gastrointestinal: Positive for abdominal pain and constipation. Negative for abdominal distention, anal bleeding, blood in stool, diarrhea, nausea, rectal pain and vomiting. Allergic/Immunologic: Negative for food allergies. All other systems reviewed and are negative. Objective:     /72   Pulse 61   Ht 5' 5.5\" (1.664 m)   Wt 138 lb 3.2 oz (62.7 kg)   SpO2 98%   BMI 22.65 kg/m²     Physical Exam  Vitals reviewed. Constitutional:       General: She is not in acute distress. Appearance: She is well-developed. HENT:      Head: Normocephalic and atraumatic. Right Ear: External ear normal.      Left Ear: External ear normal.      Nose: Nose normal.      Comments: Mask on     Mouth/Throat:      Comments: Mask on  Eyes:      Conjunctiva/sclera: Conjunctivae normal.      Pupils: Pupils are equal, round, and reactive to light. Cardiovascular:      Rate and Rhythm: Normal rate and regular rhythm.       Heart sounds: Normal heart sounds. No murmur heard. No friction rub. No gallop. Pulmonary:      Effort: Pulmonary effort is normal. No respiratory distress. Breath sounds: Normal breath sounds. Abdominal:      General: Bowel sounds are normal. There is no distension. Palpations: Abdomen is soft. There is no mass. Tenderness: There is no abdominal tenderness. There is no guarding or rebound. Musculoskeletal:         General: Normal range of motion. Cervical back: Normal range of motion and neck supple. Skin:     General: Skin is warm and dry. Findings: No rash. Nails: There is no clubbing. Neurological:      Mental Status: She is alert and oriented to person, place, and time. Gait: Gait normal.   Psychiatric:         Behavior: Behavior normal.         Thought Content:  Thought content normal.

## 2022-03-25 DIAGNOSIS — Z11.52 ENCOUNTER FOR SCREENING FOR COVID-19: ICD-10-CM

## 2022-03-25 LAB — SARS-COV-2, PCR: NOT DETECTED

## 2022-03-28 ENCOUNTER — ANESTHESIA EVENT (OUTPATIENT)
Dept: OPERATING ROOM | Age: 67
End: 2022-03-28

## 2022-03-28 ENCOUNTER — HOSPITAL ENCOUNTER (OUTPATIENT)
Age: 67
Setting detail: SPECIMEN
Discharge: HOME | End: 2022-03-28
Payer: MEDICARE

## 2022-03-28 ENCOUNTER — HOSPITAL ENCOUNTER (OUTPATIENT)
Age: 67
Setting detail: OUTPATIENT SURGERY
Discharge: HOME | End: 2022-03-28
Attending: INTERNAL MEDICINE | Admitting: INTERNAL MEDICINE
Payer: MEDICARE

## 2022-03-28 ENCOUNTER — ANESTHESIA (OUTPATIENT)
Dept: OPERATING ROOM | Age: 67
End: 2022-03-28

## 2022-03-28 ENCOUNTER — HOSPITAL ENCOUNTER (OUTPATIENT)
Dept: OPERATING ROOM | Age: 67
Setting detail: OUTPATIENT SURGERY
Discharge: HOME | End: 2022-03-29

## 2022-03-28 VITALS
HEIGHT: 65 IN | OXYGEN SATURATION: 100 % | WEIGHT: 132 LBS | DIASTOLIC BLOOD PRESSURE: 77 MMHG | RESPIRATION RATE: 20 BRPM | HEART RATE: 54 BPM | SYSTOLIC BLOOD PRESSURE: 125 MMHG | BODY MASS INDEX: 21.99 KG/M2 | TEMPERATURE: 98.1 F

## 2022-03-28 VITALS — DIASTOLIC BLOOD PRESSURE: 54 MMHG | SYSTOLIC BLOOD PRESSURE: 88 MMHG | OXYGEN SATURATION: 99 %

## 2022-03-28 PROCEDURE — 45385 COLONOSCOPY W/LESION REMOVAL: CPT | Performed by: INTERNAL MEDICINE

## 2022-03-28 PROCEDURE — 88305 TISSUE EXAM BY PATHOLOGIST: CPT

## 2022-03-28 PROCEDURE — 45385 COLONOSCOPY W/LESION REMOVAL: CPT

## 2022-03-28 RX ORDER — LIDOCAINE HYDROCHLORIDE 10 MG/ML
INJECTION, SOLUTION INFILTRATION; PERINEURAL PRN
Status: DISCONTINUED | OUTPATIENT
Start: 2022-03-28 | End: 2022-03-28 | Stop reason: SDUPTHER

## 2022-03-28 RX ORDER — SODIUM CHLORIDE 9 MG/ML
INJECTION, SOLUTION INTRAVENOUS CONTINUOUS
Status: DISCONTINUED | OUTPATIENT
Start: 2022-03-28 | End: 2022-03-28 | Stop reason: HOSPADM

## 2022-03-28 RX ORDER — PROPOFOL 10 MG/ML
INJECTION, EMULSION INTRAVENOUS PRN
Status: DISCONTINUED | OUTPATIENT
Start: 2022-03-28 | End: 2022-03-28 | Stop reason: SDUPTHER

## 2022-03-28 RX ADMIN — SODIUM CHLORIDE: 9 INJECTION, SOLUTION INTRAVENOUS at 10:39

## 2022-03-28 RX ADMIN — PROPOFOL 200 MG: 10 INJECTION, EMULSION INTRAVENOUS at 11:09

## 2022-03-28 RX ADMIN — LIDOCAINE HYDROCHLORIDE 40 MG: 10 INJECTION, SOLUTION INFILTRATION; PERINEURAL at 11:09

## 2022-03-28 NOTE — ANESTHESIA POSTPROCEDURE EVALUATION
Department of Anesthesiology  Postprocedure Note    Patient: Orion Garcia  MRN: [id number]  YOB: 1955  Date of evaluation: 3/28/2022  Time:  11:31 AM     Procedure Summary     Date: 03/28/22 Room / Location: [facility] ENDO 01 / [address] [place] [address]    Anesthesia Start: 6413 Anesthesia Stop:     Procedure: COLONOSCOPY DIAGNOSTIC (N/A Abdomen) Diagnosis: (WORSENING CONSTIP, ABD PAIN)    Surgeons: Isa De Santiago MD Responsible Provider: MICHAEL Pressley CRNA    Anesthesia Type: general, TIVA ASA Status: 2          Anesthesia Type: No value filed. Virgen Phase I:      Virgen Phase II:      Last vitals: Reviewed and per EMR flowsheets.        Anesthesia Post Evaluation    Patient location during evaluation: bedside  Patient participation: complete - patient participated  Level of consciousness: sleepy but conscious  Pain score: 0  Airway patency: patent  Nausea & Vomiting: no nausea and no vomiting  Complications: no  Cardiovascular status: hemodynamically stable and blood pressure returned to baseline  Respiratory status: acceptable and nasal cannula  Hydration status: stable

## 2022-03-28 NOTE — ANESTHESIA PRE PROCEDURE
Department of Anesthesiology  Preprocedure Note       Name:  Giovani Man   Age:  78 y.o.  :  1955                                          MRN:  [id number]         Date:  3/28/2022      Surgeon: Guera Pickens):  Giselle Mccoy MD    Procedure: Procedure(s):  COLONOSCOPY DIAGNOSTIC    Medications prior to admission:   Prior to Admission medications    Medication Sig Start Date End Date Taking?  Authorizing Provider   polyethylene glycol (MIRALAX) 17 g PACK packet Take 17 g by mouth as needed    Historical Provider, MD   lubiprostone (AMITIZA) 24 MCG capsule Take 1 capsule by mouth 2 times daily (with meals) 22   MICHAEL Cohn NP   Hyoscyamine Sulfate SL (LEVSIN/SL) 0.125 MG SUBL Place 0.125 mg under the tongue every 4-6 hours as needed (abdominal cramping/pain) 22   Joselyn Balbuena MD   MILK THISTLE PO Take by mouth daily    Historical Provider, MD   COLLAGEN PO Take 500 mg by mouth 2 times daily    Historical Provider, MD   Coenzyme Q10 (COQ10 PO) Take 200 mg by mouth daily    Historical Provider, MD   NONFORMULARY daily Estradiol benzonate bulk powder    Historical Provider, MD   EYEBRIGHT PO Take by mouth daily    Historical Provider, MD   L-Lysine 1000 MG TABS Take by mouth daily    Historical Provider, MD   Red Yeast Rice Extract 600 MG TABS Take by mouth daily    Historical Provider, MD   TESTOSTERONE COMPOUNDING KIT TD Place onto the skin daily     Historical Provider, MD   Cholecalciferol (VITAMIN D) 50 MCG (2000 UT) CAPS capsule Take 2,000 Units by mouth daily    Historical Provider, MD   Ascorbic Acid (VITAMIN C) 1000 MG tablet Take 1,000 mg by mouth daily    Historical Provider, MD   Multiple Vitamins-Minerals (THERAPEUTIC MULTIVITAMIN-MINERALS) tablet Take 1 tablet by mouth daily    Historical Provider, MD   calcium carbonate (OSCAL) 500 MG TABS tablet Take 500 mg by mouth daily    Historical Provider, MD   LECITHIN-19 PO Take 1,300 mg by mouth 2 times daily    Historical Provider, MD   Flaxseed Oil OIL Take 1,550 mg by mouth 2 times daily    Historical Provider, MD   OMEGA-3 KRILL OIL PO Take 350 mg by mouth daily    Historical Provider, MD   NONFORMULARY nightly For Hormone Progesterone 50mg    Historical Provider, MD       Current medications:    Current Facility-Administered Medications   Medication Dose Route Frequency Provider Last Rate Last Admin    0.9 % sodium chloride infusion   IntraVENous Continuous Danny Mcmullen MD           Allergies:  No Known Allergies    Problem List:    Patient Active Problem List   Diagnosis Code    Colorectal polyps K63.5    Dyspepsia R10.13       Past Medical History:        Diagnosis Date    Cervical spine pain     GERD (gastroesophageal reflux disease)     History of blood transfusion     Hypercholesteremia     Liver mass     Mitral valve disorder     Neuropathy     PONV (postoperative nausea and vomiting)     Postmenopausal osteoporosis        Past Surgical History:        Procedure Laterality Date    BREAST BIOPSY      aretha neg    BUNIONECTOMY Left     COLONOSCOPY      Dr BATES    COLONOSCOPY      Shiben: neg. inadequate prep    COLONOSCOPY N/A 2019    Dr NADER Melendez-melanosis coli, moderate sized hemorrhoids-Tubular AP (-) dysplasia    EGD      HYSTERECTOMY      OVARY REMOVAL      UPPER GASTROINTESTINAL ENDOSCOPY N/A 2019    Dr NADER Melendez-moderately severe esophagitis, gastritis       Social History:    Social History     Tobacco Use    Smoking status: Former Smoker     Quit date:      Years since quittin.2    Smokeless tobacco: Never Used   Substance Use Topics    Alcohol use: Never                                Counseling given: Not Answered      Vital Signs (Current): There were no vitals filed for this visit.                                            BP Readings from Last 3 Encounters:   22 124/72   22 120/76   10/27/21 120/70       NPO Status: BMI:   Wt Readings from Last 3 Encounters:   02/28/22 138 lb 3.2 oz (62.7 kg)   10/27/21 138 lb (62.6 kg)   07/14/21 134 lb 6.4 oz (61 kg)     There is no height or weight on file to calculate BMI.    CBC:   Lab Results   Component Value Date    WBC 10.4 02/11/2022    RBC 3.93 02/11/2022    HGB 13.3 02/11/2022    HCT 40.2 02/11/2022    .3 02/11/2022    RDW 11.5 02/11/2022     02/11/2022       CMP:   Lab Results   Component Value Date     02/11/2022    K 4.3 02/11/2022     02/11/2022    CO2 24 02/11/2022    BUN 21 02/11/2022    CREATININE 0.8 02/11/2022    GFRAA >59 02/11/2022    LABGLOM >60 02/11/2022    GLUCOSE 123 02/11/2022    PROT 7.0 02/11/2022    CALCIUM 9.1 02/11/2022    BILITOT 0.3 02/11/2022    ALKPHOS 81 02/11/2022    AST 16 02/11/2022    ALT 11 02/11/2022       POC Tests: No results for input(s): POCGLU, POCNA, POCK, POCCL, POCBUN, POCHEMO, POCHCT in the last 72 hours. Coags: No results found for: PROTIME, INR, APTT    HCG (If Applicable): No results found for: PREGTESTUR, PREGSERUM, HCG, HCGQUANT     ABGs: No results found for: PHART, PO2ART, DRA5FKW, KIU5AJW, BEART, M6UUWRIV     Type & Screen (If Applicable):  No results found for: LABABO, LABRH    Drug/Infectious Status (If Applicable):  No results found for: HIV, HEPCAB    COVID-19 Screening (If Applicable):   Lab Results   Component Value Date    COVID19 Not Detected 03/25/2022           Anesthesia Evaluation  Patient summary reviewed   history of anesthetic complications: PONV.   Airway: Mallampati: II  TM distance: >3 FB   Neck ROM: full  Mouth opening: < 3 FB Dental: normal exam         Pulmonary:normal exam                              ROS comment: Former smoker- quit 1974   Cardiovascular:  Exercise tolerance: good (>4 METS),            Beta Blocker:  Not on Beta Blocker         Neuro/Psych:                ROS comment: neuropathy GI/Hepatic/Renal:   (+) GERD:, bowel prep, Endo/Other: Negative Endo/Other ROS                    Abdominal:             Vascular: negative vascular ROS. Other Findings:             Anesthesia Plan      general and TIVA     ASA 2       Induction: intravenous. Anesthetic plan and risks discussed with patient.                       MICHAEL Hodge - CRNA   3/28/2022

## 2022-03-28 NOTE — H&P
Patient Name: Kristian David  : 1955  MRN: [id number]  DATE: 22    Allergies: No Known Allergies     ENDOSCOPY  History and Physical    Procedure:    [x] Diagnostic Colonoscopy       [] Screening Colonoscopy  [] EGD      [] ERCP      [] EUS       [] Other    [x] Previous office notes/History and Physical reviewed from the patients chart. Please see EMR for further details of HPI. I have examined the patient's status immediately prior to the procedure and:      Indications/HPI:    [x]Abdominal Pain   []Barretts  []Screening/Surveillance   []History of Polyps  []Dysphagia            [] +Cologard/DNA testing  []Abnormal Imaging              []EOE Hx              [x] Family Hx of CRC/Polyps  []Anemia                            []Food Impaction       []Recent Poor Prep  []GI Bleed             []Lymphadenopathy  []History of Polyps  [x]Change in bowel habits []Heartburn/Reflux  []Cancer- GI/Lung  []Chest Pain - Non Cardiac []Heme (+) Stool []Ulcers  [x]Constipation  []Hemoptysis  []Incontinence    []Diarrhea  []Hypoxemia  []Rectal Bleed (BRBPR)  []Nausea/Vomiting   [] Varices  []Crohns/Colitis  []Pancreatic Cyst   [] Cirrhosis   []Pancreatitis    []Abnormal MRCP  []Elevated LFT [] Stent Removal, Previous ERCP  []Other:     Anesthesia:   [x] MAC [] Moderate Sedation   [] General   [] None     ROS: 12 pt Review of Symptoms was negative unless mentioned above    Medications:   Prior to Admission medications    Medication Sig Start Date End Date Taking?  Authorizing Provider   polyethylene glycol (MIRALAX) 17 g PACK packet Take 17 g by mouth as needed    Historical Provider, MD   lubiprostone (AMITIZA) 24 MCG capsule Take 1 capsule by mouth 2 times daily (with meals) 22   MICHAEL Messer - NP   Hyoscyamine Sulfate SL (LEVSIN/SL) 0.125 MG SUBL Place 0.125 mg under the tongue every 4-6 hours as needed (abdominal cramping/pain) 22   Terese Gray MD   MILK THISTLE PO Take by mouth daily Historical Provider, MD   COLLAGEN PO Take 500 mg by mouth 2 times daily    Historical Provider, MD   Coenzyme Q10 (COQ10 PO) Take 200 mg by mouth daily    Historical Provider, MD   NONFORMULARY daily Estradiol benzonate bulk powder    Historical Provider, MD   EYEBRIGHT PO Take by mouth daily    Historical Provider, MD   L-Lysine 1000 MG TABS Take by mouth daily    Historical Provider, MD   Red Yeast Rice Extract 600 MG TABS Take by mouth daily    Historical Provider, MD   TESTOSTERONE COMPOUNDING KIT TD Place onto the skin daily     Historical Provider, MD   Cholecalciferol (VITAMIN D) 50 MCG (2000 UT) CAPS capsule Take 2,000 Units by mouth daily    Historical Provider, MD   Ascorbic Acid (VITAMIN C) 1000 MG tablet Take 1,000 mg by mouth daily    Historical Provider, MD   Multiple Vitamins-Minerals (THERAPEUTIC MULTIVITAMIN-MINERALS) tablet Take 1 tablet by mouth daily    Historical Provider, MD   calcium carbonate (OSCAL) 500 MG TABS tablet Take 500 mg by mouth daily    Historical Provider, MD   LECITHIN-19 PO Take 1,300 mg by mouth 2 times daily    Historical Provider, MD   Flaxseed Oil OIL Take 1,550 mg by mouth 2 times daily    Historical Provider, MD   OMEGA-3 KRILL OIL PO Take 350 mg by mouth daily    Historical Provider, MD   NONFORMULARY nightly For Hormone Progesterone 50mg    Historical Provider, MD       Past Medical History:  Past Medical History:   Diagnosis Date    Cervical spine pain     GERD (gastroesophageal reflux disease)     History of blood transfusion     Hypercholesteremia     Liver mass     Mitral valve disorder     Neuropathy     PONV (postoperative nausea and vomiting)     Postmenopausal osteoporosis        Past Surgical History:  Past Surgical History:   Procedure Laterality Date    BREAST BIOPSY      aretha neg    BUNIONECTOMY Left     COLONOSCOPY  2003    Dr BATES    COLONOSCOPY  2013    Shiben: neg. inadequate prep    COLONOSCOPY N/A 9/19/2019    Dr NADER Melendez-melanosis coli, moderate sized hemorrhoids-Tubular AP (-) dysplasia    EGD      HYSTERECTOMY      OVARY REMOVAL      UPPER GASTROINTESTINAL ENDOSCOPY N/A 2019    Dr NADER Melendez-moderately severe esophagitis, gastritis       Social History:  Social History     Tobacco Use    Smoking status: Former Smoker     Quit date: 1974     Years since quittin.2    Smokeless tobacco: Never Used   Vaping Use    Vaping Use: Never used   Substance Use Topics    Alcohol use: Never    Drug use: Never       Vital Signs: There were no vitals filed for this visit. Physical Exam:  Cardiac:  [x]WNL  []Comments:  Pulmonary:  [x]WNL   []Comments:  Neuro/Mental Status:  [x]WNL  []Comments:  Abdominal:  [x]WNL    []Comments:  Other:   []WNL  []Comments:    Informed Consent:  The risks and benefits of the procedure have been discussed with either the patient or if they cannot consent, their representative. Assessment:  Patient examined and appropriate for planned sedation and procedure. Plan:  Proceed with planned sedation and procedure as above. Nikki Linder am scribing for and in the presence of Dr. Dre Lewis MD.  Electronically signed by Keira Mazariegos RN on 3/28/2022 at 10:26 AM    I personally performed the services described in this documentation as scribed by Cecilia Sanford, and it appears accurate and complete.      Dre Lewis MD  3/28/2022

## 2022-03-28 NOTE — OP NOTE
Patient: Yun Perez : 1955  Med Rec#: [id number] Acc#: [id number]   Primary Care Provider Salvador Mixon MD    Date of Procedure:  3/28/2022    Endoscopist: Edie Mauricio MD    Referring Provider: Salvador Mixon MD, Audry Primrose, APRN    Operation Performed: Colonoscopy with snare polypectomy    Indications: Change in bowel habits- constipation, hx of polyps    Anesthesia:  Sedation was administered by anesthesia who monitored the patient during the procedure. I met with Yun Perez prior to procedure. We discussed the procedure itself, and I have discussed the risks of endoscopy (including-- but not limited to-- pain, discomfort, bleeding potentially requiring second endoscopic procedure and/or blood transfusion, organ perforation requiring operative repair, damage to organs near the colon, infection, aspiration, cardiopulmonary/allergic reaction), benefits, indications to endoscopy. Additionally, we discussed options other than colonoscopy. The patient expressed understanding. All questions answered. The patient decided to proceed with the procedure. Signed informed consent was placed on the chart. Blood Loss: minimal    Withdrawal time: n/a  Bowel Prep: adequate     Complications: no immediate complications    DESCRIPTION OF PROCEDURE:     A time out was performed. After written informed consent was obtained, the patient was placed in the left lateral position. The perianal area was inspected, and a digital rectal exam was performed. A rectal exam was performed: normal tone, no palpable lesions. At this point, a forward viewing Olympus colonoscope was inserted into the anus and carefully advanced to the cecum. The cecum was identified by the ileocecal valve and the appendiceal orifice. The colonoscope was then slowly withdrawn with careful inspection of the mucosa in a linear and circumferential fashion. The scope was retroflexed in the rectum.  Suction was utilized during the procedure to remove as much air as possible from the bowel. The colonoscope was removed from the patient, and the procedure was terminated. Findings are listed below. Findings: The mucosa appeared normal throughout the entire examined colon. In the ascending colon, a 11 mm sessile polyp was removed piecemeal with cold snare polypectomy. Retroflexion in the rectum was normal and revealed no further abnormalities. Recommendations:  1. Repeat colonoscopy: pending pathology - 1 year, due to piecemeal polypectomy  2. Await biopsy results-you will receive a letter with your results. Findings and recommendations were discussed w/ the patient. A copy of the images was provided. Lori Jimenez am scribing for and in the presence of Dr. Sugey Santiago MD.  Electronically signed by Nohemi Roberts RN on 3/28/2022 at 10:26 AM    I personally performed the services described in this documentation as scribed by Courtney Yan, and it appears accurate and complete.      Sugey Santiago MD  3/28/2022

## 2022-04-18 ENCOUNTER — OFFICE VISIT (OUTPATIENT)
Dept: GASTROENTEROLOGY | Age: 67
End: 2022-04-18
Payer: MEDICARE

## 2022-04-18 VITALS
SYSTOLIC BLOOD PRESSURE: 138 MMHG | HEIGHT: 65 IN | HEART RATE: 56 BPM | OXYGEN SATURATION: 99 % | BODY MASS INDEX: 23.46 KG/M2 | WEIGHT: 140.8 LBS | DIASTOLIC BLOOD PRESSURE: 76 MMHG

## 2022-04-18 DIAGNOSIS — K58.1 IRRITABLE BOWEL SYNDROME WITH CONSTIPATION: Primary | ICD-10-CM

## 2022-04-18 PROCEDURE — 99213 OFFICE O/P EST LOW 20 MIN: CPT | Performed by: NURSE PRACTITIONER

## 2022-04-18 PROCEDURE — G8427 DOCREV CUR MEDS BY ELIG CLIN: HCPCS | Performed by: NURSE PRACTITIONER

## 2022-04-18 PROCEDURE — 1090F PRES/ABSN URINE INCON ASSESS: CPT | Performed by: NURSE PRACTITIONER

## 2022-04-18 PROCEDURE — 3017F COLORECTAL CA SCREEN DOC REV: CPT | Performed by: NURSE PRACTITIONER

## 2022-04-18 PROCEDURE — 1036F TOBACCO NON-USER: CPT | Performed by: NURSE PRACTITIONER

## 2022-04-18 PROCEDURE — 1123F ACP DISCUSS/DSCN MKR DOCD: CPT | Performed by: NURSE PRACTITIONER

## 2022-04-18 PROCEDURE — G8399 PT W/DXA RESULTS DOCUMENT: HCPCS | Performed by: NURSE PRACTITIONER

## 2022-04-18 PROCEDURE — 4040F PNEUMOC VAC/ADMIN/RCVD: CPT | Performed by: NURSE PRACTITIONER

## 2022-04-18 PROCEDURE — G8420 CALC BMI NORM PARAMETERS: HCPCS | Performed by: NURSE PRACTITIONER

## 2022-04-18 RX ORDER — PLECANATIDE 3 MG/1
3 TABLET ORAL DAILY
Qty: 90 TABLET | Refills: 3 | Status: SHIPPED | OUTPATIENT
Start: 2022-04-18

## 2022-04-18 ASSESSMENT — ENCOUNTER SYMPTOMS
CONSTIPATION: 1
NAUSEA: 0
TROUBLE SWALLOWING: 0
DIARRHEA: 0
ABDOMINAL PAIN: 0
SHORTNESS OF BREATH: 0
VOMITING: 0
BLOOD IN STOOL: 0
ANAL BLEEDING: 0
ABDOMINAL DISTENTION: 0
COUGH: 0
RECTAL PAIN: 0
CHOKING: 0

## 2022-04-18 NOTE — PROGRESS NOTES
Subjective:     Patient ID: Carrie Crockett is a 78 y.o. female  PCP: Maximiliano Carey MD  Referring Provider: No ref. provider found    HPI  Patient presents to the office today with the following complaints: Follow-up      Pt seen today for follow up after colonoscopy on 3/28/22 for worsening constipation and abdominal pain. She is currently taking Trulance daily. She will use Miralax prn. BM daily, in small amounts. She has tried and failed Lactulose, Linzess, and Amitiza. She feels using Trulance and Miralax daily works best for her. She feels symptoms are currently manageable. She is requesting refill on Trulance. Colonoscopy Findings 3/28/22: The mucosa appeared normal throughout the entire examined colon. In the ascending colon, a 11 mm sessile polyp was removed piecemeal with cold snare polypectomy. Retroflexion in the rectum was normal and revealed no further abnormalities. Recommendations:  1. Repeat colonoscopy: pending pathology - 1 year, due to piecemeal polypectomy  2. Await biopsy results-you will receive a letter with your results. FINAL DIAGNOSIS:   Colon, ascending colonic polypectomy:   Consistent with early tubular adenoma, negative for evidence of high-grade dysplasia. Assessment:     1. Irritable bowel syndrome with constipation            Plan:   - Continue Trulance 3 mg po daily, refills provided  - Ok to use Miralax daily, adjust dose as needed, instructions provided  - Follow up yearly or sooner if needed  - Call with any questions or concerns      Orders  No orders of the defined types were placed in this encounter.     Medications  Orders Placed This Encounter   Medications    Plecanatide (TRULANCE) 3 MG TABS     Sig: Take 3 mg by mouth daily     Dispense:  90 tablet     Refill:  3         Patient History:     Past Medical History:   Diagnosis Date    Cervical spine pain     GERD (gastroesophageal reflux disease)     History of blood transfusion     Hypercholesteremia     Liver mass     Mitral valve disorder     Neuropathy     PONV (postoperative nausea and vomiting)     Postmenopausal osteoporosis        Past Surgical History:   Procedure Laterality Date    BREAST BIOPSY Bilateral     neg    BUNIONECTOMY Left     COLONOSCOPY  2002    Dr Jose Santoyo, 5-10 yr recall   [facility] Barroso      Dr Ani Kaiser, inadequate prep    COLONOSCOPY N/A 2019    Dr NADER Melendez-melanosis coli, moderate sized hemorrhoids-Tubular AP (-) dysplasia    COLONOSCOPY N/A 2022    Dr Malka Pike, 1 yr recall    EGD      HYSTERECTOMY      OVARY REMOVAL      UPPER GASTROINTESTINAL ENDOSCOPY N/A 2019    Dr NADER Melendez-Moderately severe esophagitis, gastritis    UPPER GASTROINTESTINAL ENDOSCOPY  2006    Dr Delia Bess neg, gastritis, small HH       Family History   Problem Relation Age of Onset    Lung Cancer Father     Heart Disease Mother     Celiac Disease Brother     Liver Cancer Maternal Grandfather     Colon Cancer Neg Hx     Colon Polyps Neg Hx     Esophageal Cancer Neg Hx     Liver Disease Neg Hx     Rectal Cancer Neg Hx     Stomach Cancer Neg Hx        Social History     Socioeconomic History    Marital status:      Spouse name: None    Number of children: None    Years of education: None    Highest education level: None   Occupational History    None   Tobacco Use    Smoking status: Former Smoker     Quit date:      Years since quittin.3    Smokeless tobacco: Never Used   Vaping Use    Vaping Use: Never used   Substance and Sexual Activity    Alcohol use: Never    Drug use: Never    Sexual activity: None   Other Topics Concern    None   Social History Narrative    None     Social Determinants of Health     Financial Resource Strain:     Difficulty of Paying Living Expenses: Not on file   Food Insecurity:     Worried About Running Out of Food in the Last Year: Not on file    Elizabeth bell Food in the Last Year: Not on file   Transportation Needs:     Lack of Transportation (Medical): Not on file    Lack of Transportation (Non-Medical):  Not on file   Physical Activity:     Days of Exercise per Week: Not on file    Minutes of Exercise per Session: Not on file   Stress:     Feeling of Stress : Not on file   Social Connections:     Frequency of Communication with Friends and Family: Not on file    Frequency of Social Gatherings with Friends and Family: Not on file    Attends Protestant Services: Not on file    Active Member of [address] [place] [address] [employer] or Organizations: Not on file    Attends Club or Organization Meetings: Not on file    Marital Status: Not on file   Intimate Partner Violence:     Fear of Current or Ex-Partner: Not on file    Emotionally Abused: Not on file    Physically Abused: Not on file    Sexually Abused: Not on file   Housing Stability:     Unable to Pay for Housing in the Last Year: Not on file    Number of [place] in the Last Year: Not on file    Unstable Housing in the Last Year: Not on file       Current Outpatient Medications   Medication Sig Dispense Refill    Plecanatide (TRULANCE) 3 MG TABS Take 3 mg by mouth daily 90 tablet 3    polyethylene glycol (MIRALAX) 17 g PACK packet Take 17 g by mouth as needed      Hyoscyamine Sulfate SL (LEVSIN/SL) 0.125 MG SUBL Place 0.125 mg under the tongue every 4-6 hours as needed (abdominal cramping/pain) 30 each 0    MILK THISTLE PO Take by mouth daily      COLLAGEN PO Take 500 mg by mouth 2 times daily      Coenzyme Q10 (COQ10 PO) Take 200 mg by mouth daily      NONFORMULARY daily Estradiol benzonate bulk powder      EYEBRIGHT PO Take by mouth daily      L-Lysine 1000 MG TABS Take by mouth daily      Red Yeast Rice Extract 600 MG TABS Take by mouth daily      TESTOSTERONE COMPOUNDING KIT TD Place onto the skin daily       Cholecalciferol (VITAMIN D) 50 MCG (2000 UT) CAPS capsule Take 2,000 Units by mouth daily      Ascorbic Acid (VITAMIN C) 1000 MG tablet Take 1,000 mg by mouth daily      Multiple Vitamins-Minerals (THERAPEUTIC MULTIVITAMIN-MINERALS) tablet Take 1 tablet by mouth daily      calcium carbonate (OSCAL) 500 MG TABS tablet Take 500 mg by mouth daily      LECITHIN-19 PO Take 1,300 mg by mouth 2 times daily      Flaxseed Oil OIL Take 1,550 mg by mouth daily       OMEGA-3 KRILL OIL PO Take 350 mg by mouth daily      NONFORMULARY nightly For Hormone Progesterone 50mg       No current facility-administered medications for this visit. No Known Allergies    Review of Systems   Constitutional: Negative for activity change, appetite change, fatigue, fever and unexpected weight change. HENT: Negative for trouble swallowing. Respiratory: Negative for cough, choking and shortness of breath. Cardiovascular: Negative for chest pain. Gastrointestinal: Positive for constipation. Negative for abdominal distention, abdominal pain, anal bleeding, blood in stool, diarrhea, nausea, rectal pain and vomiting. Allergic/Immunologic: Negative for food allergies. All other systems reviewed and are negative. Objective:     /76   Pulse 56   Ht 5' 5\" (1.651 m)   Wt 140 lb 12.8 oz (63.9 kg)   SpO2 99%   BMI 23.43 kg/m²     Physical Exam  Vitals reviewed. Constitutional:       General: She is not in acute distress. Appearance: She is well-developed. HENT:      Head: Normocephalic and atraumatic. Right Ear: External ear normal.      Left Ear: External ear normal.      Nose: Nose normal.      Comments: Mask on     Mouth/Throat:      Comments: Mask on  Eyes:      General: No scleral icterus. Right eye: No discharge. Left eye: No discharge. Conjunctiva/sclera: Conjunctivae normal.      Pupils: Pupils are equal, round, and reactive to light. Cardiovascular:      Rate and Rhythm: Normal rate and regular rhythm. Heart sounds: Normal heart sounds.  No murmur heard.      Pulmonary:      Effort: Pulmonary effort is normal. No respiratory distress. Breath sounds: Normal breath sounds. No wheezing or rales. Abdominal:      General: Bowel sounds are normal. There is no distension. Palpations: Abdomen is soft. There is no mass. Tenderness: There is no abdominal tenderness. There is no guarding or rebound. Musculoskeletal:         General: Normal range of motion. Cervical back: Normal range of motion and neck supple. Skin:     General: Skin is warm and dry. Coloration: Skin is not pale. Neurological:      Mental Status: She is alert and oriented to person, place, and time.    Psychiatric:         Behavior: Behavior normal.

## 2022-04-18 NOTE — PATIENT INSTRUCTIONS
Increasing Your Fiber Intake   What is fiber? Fiber is the portion of plant foods that cannot be digested. There are two kinds of fiber, both of which are keys to a healthy diet and a healthy digestive system:   - Soluble fiber aids in bulking and moving food through the gut. It forms a gel when mixed with liquid. - Insoluble fiber does not mix with liquids and passes through the GI tract mostly intact. It is sometimes called \"roughage. \"     Why do I need to eat it? Fiber has many important roles:   - Helps maintain regular bowel movements. More fiber can improve both diarrhea and constipation.   - Reduces the risk of developing hemorrhoids. - Lowers LDL or \"bad\" cholesterol levels, which lowers risk of heart disease.   - Regulates blood sugar levels in people with diabetes. - Provides a feeling of fullness and may help with weight loss. How much do I need? The Academy of Nutrition and Dietetics recommends:   - For women, 25 grams per day under age 48 and 21 grams per day over age 48. - For men, 38 grams per day under age 48 and 30 grams per day over age 48. What foods are the best sources? Plant foods contain fiber, but some more than others. Best choices are:   - Whole grains and high fiber cereals. - Dried beans and legumes. - Fruits and vegetables, especially raw. What about fiber supplements? If you need to add more fiber than you can get in your diet, consider:   - Type of Fiber: The major brands of fiber supplements (Metamucil®, Konsyl®, Citrucel®, Benefiber®, Fibercon®) all use soluble fiber and work in the same way. - Flavorings and Mixing: Many of the powdered brands have added flavoring and are mixed with just water. Other varieties are \"clear\" and can be added to numerous beverages and food items. Some brands also offer a \"wafer\" form. It's up to you! Tip: Increasing the fiber in your diet gradually may help minimize bloating and discomfort.  Be sure to drink plenty of fluids as you increase your fiber intake. Fruits  Serving size  Total fiber (grams)    Pear  1 medium  5.1    Figs, dried  2 medium  3.7    Blueberries  1 cup  3.5    Apple, with skin  1 medium  4.4    Strawberries  1 cup  3.3    Peaches, dried  3 halves  3.2    Orange  1 medium  3.1    Apricots, dried  10 halves  2.6    Raisins  1.5-ounce box  1.6    Grains, cereal & pasta  Serving size  Total fiber (grams)    Spaghetti, whole-wheat  1 cup  6.3    Bran flakes  3/4 cup  5.1    Oatmeal  1 cup  4.0    Bread, rye  1 slice  1.9    Bread, whole-wheat  1 slice  1.9    Bread, mixed-grain  1 slice  1.7    Bread, cracked-wheat  1 slice  1.4      Miralax one teaspoon daily mixed as directed until you are having daily bowel movement. If no bm for 4 days increase to two teaspoons daily and gradually increase every 3-4 days until desired bowel movement is achieved. If you do not have bm for more than 4 days please use magnesim citrate, 1/2 bottle, to prevent becoming obstructed but continue to use miralax daily. If your stools become too loose or too frequent on daily dose you may reduce the amount taken daily. Make reductions gradually, every 3-4 days. Adjust dose, more or less, as needed for desired bm. You should have a soft bm at least every 3 days. Miralax is safe and effective for long term relief of chronic constipation. Drink 6-8 glasses of water daily. Regular exercise encouraged. High fiber diet recommended.

## 2022-05-09 ENCOUNTER — HOSPITAL ENCOUNTER (OUTPATIENT)
Dept: WOMENS IMAGING | Age: 67
Discharge: HOME | End: 2022-05-09
Payer: MEDICARE

## 2022-05-09 DIAGNOSIS — Z12.31 ENCOUNTER FOR SCREENING MAMMOGRAM FOR MALIGNANT NEOPLASM OF BREAST: ICD-10-CM

## 2022-05-09 PROCEDURE — 77063 BREAST TOMOSYNTHESIS BI: CPT

## 2022-10-27 ENCOUNTER — TELEPHONE (OUTPATIENT)
Dept: GASTROENTEROLOGY | Age: 67
End: 2022-10-27

## 2022-10-27 RX ORDER — PLECANATIDE 3 MG/1
3 TABLET ORAL DAILY
Qty: 90 TABLET | Refills: 3 | OUTPATIENT
Start: 2022-10-27

## 2022-10-27 NOTE — TELEPHONE ENCOUNTER
Last visit [employer] aprn 4-18-22. No FU scheduled. Hx of IBS/C. Last CLN  3-28-22. This was ES by [employer] on 4-18-22 at one daily to [facility] on 4-18-22 # 90 x 3 additional refills. Patient notified.  Joshua reddy

## 2022-10-27 NOTE — TELEPHONE ENCOUNTER
I spoke to this patient and told her [address] aprn sent her Trulance at one daily to [phone] # 90 x 3 additional refills. Patient said they would not RF this for her because she was out of RF's. Patient is calling [address] today and she will then call me back and let me know if we need to do something for her.  [facility]

## 2022-10-27 NOTE — TELEPHONE ENCOUNTER
Pt requesting samples of trulance until script is filled. Per pt only has 3 left.  Please send to [facility] mail order pharmacy

## 2022-12-21 ENCOUNTER — HOSPITAL ENCOUNTER (OUTPATIENT)
Dept: MRI IMAGING | Age: 67
Discharge: HOME | End: 2022-12-21
Payer: MEDICARE

## 2022-12-21 DIAGNOSIS — R16.0 LIVER MASS: ICD-10-CM

## 2022-12-21 PROCEDURE — 74183 MRI ABD W/O CNTR FLWD CNTR: CPT

## 2022-12-21 PROCEDURE — A9577 INJ MULTIHANCE: HCPCS | Performed by: FAMILY MEDICINE

## 2022-12-21 PROCEDURE — 6360000004 HC RX CONTRAST MEDICATION: Performed by: FAMILY MEDICINE

## 2022-12-21 RX ADMIN — GADOBENATE DIMEGLUMINE 13 ML: 529 INJECTION, SOLUTION INTRAVENOUS at 13:55

## 2023-04-19 ENCOUNTER — OFFICE VISIT (OUTPATIENT)
Dept: GASTROENTEROLOGY | Age: 68
End: 2023-04-19

## 2023-04-19 VITALS
BODY MASS INDEX: 23.32 KG/M2 | HEIGHT: 65 IN | OXYGEN SATURATION: 98 % | WEIGHT: 140 LBS | SYSTOLIC BLOOD PRESSURE: 120 MMHG | HEART RATE: 68 BPM | DIASTOLIC BLOOD PRESSURE: 80 MMHG

## 2023-04-19 DIAGNOSIS — R13.10 DYSPHAGIA, UNSPECIFIED TYPE: ICD-10-CM

## 2023-04-19 DIAGNOSIS — K58.1 IRRITABLE BOWEL SYNDROME WITH CONSTIPATION: Primary | ICD-10-CM

## 2023-04-19 DIAGNOSIS — K21.9 CHRONIC GERD: ICD-10-CM

## 2023-04-19 RX ORDER — PLECANATIDE 3 MG/1
3 TABLET ORAL DAILY
Qty: 90 TABLET | Refills: 3 | Status: SHIPPED | OUTPATIENT
Start: 2023-04-19

## 2023-04-19 ASSESSMENT — ENCOUNTER SYMPTOMS
CHOKING: 0
COUGH: 0
SHORTNESS OF BREATH: 0
TROUBLE SWALLOWING: 1
DIARRHEA: 0
ABDOMINAL DISTENTION: 0
BLOOD IN STOOL: 0
CONSTIPATION: 1
ANAL BLEEDING: 0
RECTAL PAIN: 0
VOMITING: 0
ABDOMINAL PAIN: 0
NAUSEA: 0

## 2023-04-19 NOTE — PROGRESS NOTES
to their scheduled procedure to drive them home - they agree to the above and are willing to continue. Orders  No orders of the defined types were placed in this encounter.     Medications  Orders Placed This Encounter   Medications    Plecanatide (TRULANCE) 3 MG TABS     Sig: Take 3 mg by mouth daily     Dispense:  90 tablet     Refill:  3         Patient History:     Past Medical History:   Diagnosis Date    Cervical spine pain     GERD (gastroesophageal reflux disease)     History of blood transfusion     Hypercholesteremia     Liver mass     Mitral valve disorder     Neuropathy     PONV (postoperative nausea and vomiting)     Postmenopausal osteoporosis        Past Surgical History:   Procedure Laterality Date    BREAST BIOPSY Bilateral     neg    BUNIONECTOMY Left     COLONOSCOPY  2002    Dr Cas Zimmerman, 5-10 yr recall    COLONOSCOPY      Dr Nelsy Gunter, inadequate prep    COLONOSCOPY N/A 2019    Dr NADER Melendez-melanosis coli, moderate sized hemorrhoids-Tubular AP (-) dysplasia    COLONOSCOPY N/A 2022    Dr Leo Ge, 1 yr recall    EGD      HYSTERECTOMY      OOPHORECTOMY      UPPER GASTROINTESTINAL ENDOSCOPY N/A 2019    Dr Delonte Melendez-Moderately severe esophagitis, gastritis    UPPER GASTROINTESTINAL ENDOSCOPY  2006    Dr Patsy Brady neg, gastritis, small HH       Family History   Problem Relation Age of Onset    Lung Cancer Father     Heart Disease Mother     Celiac Disease Brother     Liver Cancer Maternal Grandfather     Colon Cancer Neg Hx     Colon Polyps Neg Hx     Esophageal Cancer Neg Hx     Liver Disease Neg Hx     Rectal Cancer Neg Hx     Stomach Cancer Neg Hx        Social History     Socioeconomic History    Marital status:    Tobacco Use    Smoking status: Former     Types: Cigarettes     Quit date:      Years since quittin.3    Smokeless tobacco: Never   Vaping Use    Vaping Use: Never used   Substance and Sexual Activity

## 2023-05-08 ENCOUNTER — ANESTHESIA (OUTPATIENT)
Dept: OPERATING ROOM | Age: 68
End: 2023-05-08

## 2023-05-08 ENCOUNTER — ANESTHESIA EVENT (OUTPATIENT)
Dept: OPERATING ROOM | Age: 68
End: 2023-05-08

## 2023-05-08 ENCOUNTER — APPOINTMENT (OUTPATIENT)
Dept: OPERATING ROOM | Age: 68
End: 2023-05-08

## 2023-05-08 ENCOUNTER — HOSPITAL ENCOUNTER (OUTPATIENT)
Age: 68
Setting detail: OUTPATIENT SURGERY
Discharge: HOME | End: 2023-05-08
Attending: INTERNAL MEDICINE | Admitting: INTERNAL MEDICINE

## 2023-05-08 ENCOUNTER — HOSPITAL ENCOUNTER (OUTPATIENT)
Age: 68
Setting detail: SPECIMEN
Discharge: HOME | End: 2023-05-08
Payer: MEDICARE

## 2023-05-08 VITALS
HEART RATE: 49 BPM | RESPIRATION RATE: 16 BRPM | TEMPERATURE: 97.1 F | HEIGHT: 65 IN | SYSTOLIC BLOOD PRESSURE: 120 MMHG | OXYGEN SATURATION: 98 % | WEIGHT: 140 LBS | BODY MASS INDEX: 23.32 KG/M2 | DIASTOLIC BLOOD PRESSURE: 65 MMHG

## 2023-05-08 PROCEDURE — G8918 PT W/O PREOP ORDER IV AB PRO: HCPCS

## 2023-05-08 PROCEDURE — 88342 IMHCHEM/IMCYTCHM 1ST ANTB: CPT

## 2023-05-08 PROCEDURE — G8907 PT DOC NO EVENTS ON DISCHARG: HCPCS

## 2023-05-08 PROCEDURE — G0105 COLORECTAL SCRN; HI RISK IND: HCPCS

## 2023-05-08 PROCEDURE — 43239 EGD BIOPSY SINGLE/MULTIPLE: CPT

## 2023-05-08 PROCEDURE — 88305 TISSUE EXAM BY PATHOLOGIST: CPT

## 2023-05-08 RX ORDER — OMEPRAZOLE 40 MG/1
40 CAPSULE, DELAYED RELEASE ORAL
Qty: 90 CAPSULE | Refills: 3 | Status: SHIPPED | OUTPATIENT
Start: 2023-05-08

## 2023-05-08 RX ORDER — PROPOFOL 10 MG/ML
INJECTION, EMULSION INTRAVENOUS PRN
Status: DISCONTINUED | OUTPATIENT
Start: 2023-05-08 | End: 2023-05-08 | Stop reason: SDUPTHER

## 2023-05-08 RX ORDER — LIDOCAINE HYDROCHLORIDE 10 MG/ML
INJECTION, SOLUTION EPIDURAL; INFILTRATION; INTRACAUDAL; PERINEURAL PRN
Status: DISCONTINUED | OUTPATIENT
Start: 2023-05-08 | End: 2023-05-08 | Stop reason: SDUPTHER

## 2023-05-08 RX ORDER — SODIUM CHLORIDE, SODIUM LACTATE, POTASSIUM CHLORIDE, CALCIUM CHLORIDE 600; 310; 30; 20 MG/100ML; MG/100ML; MG/100ML; MG/100ML
INJECTION, SOLUTION INTRAVENOUS CONTINUOUS
Status: DISCONTINUED | OUTPATIENT
Start: 2023-05-08 | End: 2023-05-08 | Stop reason: HOSPADM

## 2023-05-08 RX ADMIN — PROPOFOL 400 MG: 10 INJECTION, EMULSION INTRAVENOUS at 13:13

## 2023-05-08 RX ADMIN — LIDOCAINE HYDROCHLORIDE 30 MG: 10 INJECTION, SOLUTION EPIDURAL; INFILTRATION; INTRACAUDAL; PERINEURAL at 13:13

## 2023-05-08 RX ADMIN — SODIUM CHLORIDE, SODIUM LACTATE, POTASSIUM CHLORIDE, CALCIUM CHLORIDE: 600; 310; 30; 20 INJECTION, SOLUTION INTRAVENOUS at 13:03

## 2023-05-08 NOTE — ANESTHESIA PRE PROCEDURE
Department of Anesthesiology  Preprocedure Note       Name:  Maryann Roshan   Age:  75 y.o.  :  1955                                          MRN:  [id number]         Date:  2023      Surgeon: Warden Ely):  Olesya Villarreal MD    Procedure: Procedure(s):  EGD BIOPSY  COLORECTAL CANCER SCREENING, NOT HIGH RISK    Medications prior to admission:   Prior to Admission medications    Medication Sig Start Date End Date Taking?  Authorizing Provider   Plecanatide (TRULANCE) 3 MG TABS Take 3 mg by mouth daily 23   MICHAEL Albert NP   polyethylene glycol (MIRALAX) 17 g PACK packet Take 17 g by mouth as needed    Historical Provider, MD   Hyoscyamine Sulfate SL (LEVSIN/SL) 0.125 MG SUBL Place 0.125 mg under the tongue every 4-6 hours as needed (abdominal cramping/pain) 22   Clare Norton MD   MILK THISTLE PO Take by mouth daily    Historical Provider, MD   COLLAGEN PO Take 500 mg by mouth 2 times daily    Historical Provider, MD   Coenzyme Q10 (COQ10 PO) Take 200 mg by mouth daily    Historical Provider, MD   NONFORMULARY daily Estradiol benzonate bulk powder    Historical Provider, MD   EYEBRIGHT PO Take by mouth daily    Historical Provider, MD   L-Lysine 1000 MG TABS Take by mouth daily    Historical Provider, MD   Red Yeast Rice Extract 600 MG TABS Take by mouth daily    Historical Provider, MD   TESTOSTERONE COMPOUNDING KIT TD Place onto the skin daily     Historical Provider, MD   Cholecalciferol (VITAMIN D) 50 MCG (2000 UT) CAPS capsule Take 2,000 Units by mouth daily    Historical Provider, MD   Ascorbic Acid (VITAMIN C) 1000 MG tablet Take 1 tablet by mouth daily    Historical Provider, MD   Multiple Vitamins-Minerals (THERAPEUTIC MULTIVITAMIN-MINERALS) tablet Take 1 tablet by mouth daily    Historical Provider, MD   calcium carbonate (OSCAL) 500 MG TABS tablet Take 1 tablet by mouth daily    Historical Provider, MD   LECITHIN-19 PO Take 1,300 mg by mouth 2 times daily    Historical

## 2023-05-08 NOTE — H&P
Patient Name: Tammie Cogan  : 1955  MRN: [id number]  DATE: 23    Allergies: No Known Allergies     ENDOSCOPY  History and Physical    Procedure:    [] Diagnostic Colonoscopy       [x] Screening Colonoscopy  [x] EGD      [] ERCP      [] EUS       [] Other    [x] Previous office notes/History and Physical reviewed from the patients chart. Please see EMR for further details of HPI. I have examined the patient's status immediately prior to the procedure and:      Indications/HPI:    []Abdominal Pain   []Barretts  []Screening/Surveillance   []History of Polyps  [x]Dysphagia            [] +Cologard/DNA testing  []Abnormal Imaging              []EOE Hx              [] Family Hx of CRC/Polyps  []Anemia                            []Food Impaction       []Recent Poor Prep  []GI Bleed             []Lymphadenopathy  [x]History of Polyps  []Change in bowel habits [x]Heartburn/Reflux  []Cancer- GI/Lung  []Chest Pain - Non Cardiac []Heme (+) Stool []Ulcers  []Constipation  []Hemoptysis  []Incontinence    []Diarrhea  []Hypoxemia  []Rectal Bleed (BRBPR)  []Nausea/Vomiting   [] Varices  []Crohns/Colitis  []Pancreatic Cyst   [] Cirrhosis   []Pancreatitis    []Abnormal MRCP  []Elevated LFT [] Stent Removal, Previous ERCP  []Other:     Anesthesia:   [x] MAC [] Moderate Sedation   [] General   [] None     ROS: 12 pt Review of Symptoms was negative unless mentioned above    Medications:   Prior to Admission medications    Medication Sig Start Date End Date Taking?  Authorizing Provider   Plecanatide (TRULANCE) 3 MG TABS Take 3 mg by mouth daily 23   MICHAEL Lopez NP   polyethylene glycol (MIRALAX) 17 g PACK packet Take 17 g by mouth as needed    Historical Provider, MD   Hyoscyamine Sulfate SL (LEVSIN/SL) 0.125 MG SUBL Place 0.125 mg under the tongue every 4-6 hours as needed (abdominal cramping/pain) 22   Chintan Al MD   MILK THISTLE PO Take by mouth daily    Historical Provider, MD

## 2023-05-08 NOTE — OP NOTE
Patient: Elzbieta Zeng : 1955  [facility] Rec#: [id number] Acc#: [id number]   Primary Care Provider Larisa Winters MD    Date of Procedure:  2023    Endoscopist: Desire Merritt MD    Referring Provider: Larisa Winters MD, MICHAEL Laughlin    Operation Performed: Colonoscopy     Indications: Screening- hx of recent piecemeal polypectomy    Anesthesia:  Sedation was administered by anesthesia who monitored the patient during the procedure. I met with Elzbieta Zeng prior to procedure. We discussed the procedure itself, and I have discussed the risks of endoscopy (including-- but not limited to-- pain, discomfort, bleeding potentially requiring second endoscopic procedure and/or blood transfusion, organ perforation requiring operative repair, damage to organs near the colon, infection, aspiration, cardiopulmonary/allergic reaction), benefits, indications to endoscopy. Additionally, we discussed options other than colonoscopy. The patient expressed understanding. All questions answered. The patient decided to proceed with the procedure. Signed informed consent was placed on the chart. Blood Loss: minimal    Withdrawal time: > 6 min  Bowel Prep: adequate     Complications: no immediate complications    DESCRIPTION OF PROCEDURE:     A time out was performed. After written informed consent was obtained, the patient was placed in the left lateral position. The perianal area was inspected, and a digital rectal exam was performed. A rectal exam was performed: normal tone, no palpable lesions. At this point, a forward viewing Olympus colonoscope was inserted into the anus and carefully advanced to the cecum. The cecum was identified by the ileocecal valve and the appendiceal orifice. The colonoscope was then slowly withdrawn with careful inspection of the mucosa in a linear and circumferential fashion. The scope was retroflexed in the rectum.  Suction was utilized during the procedure to remove as

## 2023-05-08 NOTE — ANESTHESIA POSTPROCEDURE EVALUATION
Department of Anesthesiology  Postprocedure Note    Patient: Akiko Rubin  MRN: [id number]  YOB: 1955  Date of evaluation: 5/8/2023      Procedure Summary     Date: 05/08/23 Room / Location: [facility] ENDO 01 / [address] [place] [address]    Anesthesia Start: 6694 Anesthesia Stop:     Procedures:       EGD BIOPSY (Esophagus)      COLORECTAL CANCER SCREENING, NOT HIGH RISK (Abdomen) Diagnosis:       Chronic GERD      Dysphagia, unspecified type      Hx of colonic polyps      (Chronic GERD [K21.9])      (Dysphagia, unspecified type [R13.10])      (Hx of colonic polyps [Z86.010])    Surgeons: Amalia Handley MD Responsible Provider: MICHAEL Lyon CRNA    Anesthesia Type: general, TIVA ASA Status: 2          Anesthesia Type: No value filed.     Virgen Phase I:      Virgen Phase II:        Anesthesia Post Evaluation    Patient location during evaluation: bedside  Patient participation: complete - patient participated  Level of consciousness: sleepy but conscious  Pain score: 0  Airway patency: patent  Nausea & Vomiting: no nausea and no vomiting  Complications: no  Cardiovascular status: blood pressure returned to baseline  Respiratory status: acceptable, room air and spontaneous ventilation  Hydration status: euvolemic

## 2023-05-08 NOTE — DISCHARGE INSTRUCTIONS
EGD RECOMMENDATIONS:    1. Await path results  2. Start PPI daily  3. Minimize NSAID use  4. Follow up OV with MICHAEL Rojas in 6-8 weeks    Colonoscopy Recommendations:  1. Repeat colonoscopy: 5 years, due to history of polyps      POST-OP ORDERS: ENDOSCOPY & COLONOSCOPY:    1. Rest today. 2. DO NOT eat or drink until wide awake; eat your usual diet today in moderate amount only. 3. DO NOT drive today. 4. Call physician if you have severe pain, vomiting, fever, rectal bleeding or black bowel movements. 5.  If a biopsy was taken or a polyp removed, you should expect to hear results in about 21 days. If you have heard nothing from your physician by then, call the office for results. 6.  Discharge home when patient awake, vitals signs stable and tolerating liquids. Learning About Acid-Reducing Medicines  What are they? Acid-reducing medicines can help relieve heartburn and other symptoms of indigestion. They can help prevent damage to your digestive system from stomach acids. They also are used to treat reflux and ulcer symptoms. These medicines include H2 blockers and proton pump inhibitors (PPIs). They help your stomach make less acid. You can buy them over the counter. Some of them also come in prescription strengths. Antacids can also help relieve heartburn symptoms. They reduce the acid that is already in your stomach. You can buy them over the counter. Which medicine is best for you depends on what is causing your symptoms. How do they work? Acid-reducing medicines work in two ways. H2 blockers and proton pump inhibitors (PPIs) lower the amount of acid your stomach makes. They don't work on the acid that's already there. Antacids work by making stomach juices less acidic. But your heartburn may come back as your stomach makes more acid. What are some examples? Examples of acid reducers include:  H2 blockers.   Tagamet (cimetidine)  Pepcid (famotidine)  Proton pump

## 2023-05-08 NOTE — OP NOTE
Endoscopic Procedure Note    Patient: Almaz Campbell : 1955  Med Rec#: [id number] Acc#: [id number]     Primary Care Provider Kati Manzo MD  Referring Provider: MICHAEL Obregon    Endoscopist: Evaristo Richards MD    Date of Procedure:  2023    Procedure:   1. EGD with biopsy    Indications:   1. Reflux   2. Dysphagia     Anesthesia:  Sedation was administered by anesthesia who monitored the patient during the procedure. Estimated Blood Loss: minimal    Procedure:   After reviewing the patient's chart and obtaining informed consent, the patient was placed in the left lateral decubitus position. A forward-viewing Olympus endoscope was lubricated and inserted through the mouth into the oropharynx. Under direct visualization, the upper esophagus was intubated. The scope was advanced to the level of the third portion of duodenum. Scope was slowly withdrawn with careful inspection of the mucosal surfaces. The scope was retroflexed for inspection of the gastric fundus and incisura. Findings and maneuvers are listed in impression below. The patient tolerated the procedure well. The scope was removed. There were no immediate complications. Findings:   Esophagus: normal  There is no hiatal hernia present. Stomach:  Abnormal: Mild mucosal changes suggestive of gastritis noted -  Gastric biopsies were taken from the antrum and body to rule out Helicobacter pylori infection. Duodenum: normal      IMPRESSION:  1. Gastritis- biopsied     RECOMMENDATIONS:    1. Await path results  2. Start PPI daily  3. Minimize NSAID use  4. Follow up OV with MICHAEL Vasquez in 6-8 weeks    The results were discussed with the patient and family. A copy of the images obtained were given to the patient.      Idalia Barnett am scribing for and in the presence of Dr. Evaristo Richards MD.  Electronically signed by Ling Guallpa RN on 2023 at 1:00 PM    I personally performed the services

## 2023-06-20 ENCOUNTER — HOSPITAL ENCOUNTER (OUTPATIENT)
Dept: WOMENS IMAGING | Age: 68
Discharge: HOME | End: 2023-06-20
Payer: MEDICARE

## 2023-06-20 DIAGNOSIS — Z12.31 ENCOUNTER FOR SCREENING MAMMOGRAM FOR MALIGNANT NEOPLASM OF BREAST: ICD-10-CM

## 2023-06-20 PROCEDURE — 77067 SCR MAMMO BI INCL CAD: CPT | Performed by: RADIOLOGY

## 2023-06-20 PROCEDURE — 77063 BREAST TOMOSYNTHESIS BI: CPT

## 2023-07-17 ENCOUNTER — OFFICE VISIT (OUTPATIENT)
Dept: GASTROENTEROLOGY | Age: 68
End: 2023-07-17
Payer: MEDICARE

## 2023-07-17 ENCOUNTER — TELEPHONE (OUTPATIENT)
Dept: GASTROENTEROLOGY | Age: 68
End: 2023-07-17

## 2023-07-17 VITALS
DIASTOLIC BLOOD PRESSURE: 80 MMHG | HEART RATE: 60 BPM | BODY MASS INDEX: 23.32 KG/M2 | SYSTOLIC BLOOD PRESSURE: 130 MMHG | HEIGHT: 65 IN | OXYGEN SATURATION: 96 % | WEIGHT: 140 LBS

## 2023-07-17 DIAGNOSIS — K58.1 IRRITABLE BOWEL SYNDROME WITH CONSTIPATION: ICD-10-CM

## 2023-07-17 DIAGNOSIS — K62.89 RECTAL PAIN: Primary | ICD-10-CM

## 2023-07-17 DIAGNOSIS — K29.50 MILD CHRONIC GASTRITIS: ICD-10-CM

## 2023-07-17 PROCEDURE — 99213 OFFICE O/P EST LOW 20 MIN: CPT | Performed by: NURSE PRACTITIONER

## 2023-07-17 PROCEDURE — 3017F COLORECTAL CA SCREEN DOC REV: CPT | Performed by: NURSE PRACTITIONER

## 2023-07-17 PROCEDURE — 1090F PRES/ABSN URINE INCON ASSESS: CPT | Performed by: NURSE PRACTITIONER

## 2023-07-17 PROCEDURE — 1123F ACP DISCUSS/DSCN MKR DOCD: CPT | Performed by: NURSE PRACTITIONER

## 2023-07-17 PROCEDURE — G8399 PT W/DXA RESULTS DOCUMENT: HCPCS | Performed by: NURSE PRACTITIONER

## 2023-07-17 PROCEDURE — G8420 CALC BMI NORM PARAMETERS: HCPCS | Performed by: NURSE PRACTITIONER

## 2023-07-17 PROCEDURE — 1036F TOBACCO NON-USER: CPT | Performed by: NURSE PRACTITIONER

## 2023-07-17 PROCEDURE — G8427 DOCREV CUR MEDS BY ELIG CLIN: HCPCS | Performed by: NURSE PRACTITIONER

## 2023-07-17 ASSESSMENT — ENCOUNTER SYMPTOMS
SHORTNESS OF BREATH: 0
ABDOMINAL PAIN: 0
RECTAL PAIN: 1
TROUBLE SWALLOWING: 0
ANAL BLEEDING: 0
BLOOD IN STOOL: 0
COUGH: 0
DIARRHEA: 0
CONSTIPATION: 1
VOMITING: 0
NAUSEA: 0
CHOKING: 0
ABDOMINAL DISTENTION: 0

## 2023-07-17 NOTE — TELEPHONE ENCOUNTER
07- [address]:   - Refer to pelvic floor PT       Referral placed      [facility] She stated that PT for Pelvic floor therapy is  booked out for 2-3 months   Fax [phone]       Called patient   Notified that the referral has be faxed but that they are booked out for 2-3 months.      Oked per patient-If she decides to go somewhere else then she will call us back and let us know,

## 2023-07-17 NOTE — PROGRESS NOTES
Subjective:     Patient ID: Vaishali Bates is a 70 y.o. female  PCP: Montse Burgos MD  Referring Provider: No ref. provider found    HPI  Patient presents to the office today with the following complaints: Follow-up      Pt seen today for follow up after EGD and Colonoscopy on 5/8/2023. Hx IBS with constipation with c/o rectal pain. She is currenlty taking Miralax BID. BM daily with this. She will use Trulance prn. Hx chronic GERD. PPI was started at time of EGD. However, pt stopped this on her own as she felt it was not needed and did not help. Pt denies any current issues or concerns today. All scope and pathology reports were reviewed and discussed with patient. All questions answered, pt verbalized understanding. EGD Findings 5/8/2023:   Esophagus: normal  There is no hiatal hernia present. Stomach:  Abnormal: Mild mucosal changes suggestive of gastritis noted - Gastric biopsies were taken from the antrum and body to rule out Helicobacter pylori infection. Duodenum: normal  IMPRESSION:  1. Gastritis- biopsied  RECOMMENDATIONS:    1. Await path results  2. Start PPI daily  3. Minimize NSAID use  4. Follow up OV with MICHAEL Ball in 6-8 weeks    Colonoscopy Findings 5/8/2023: The mucosa appeared normal throughout the entire examined colon. Retroflexion in the rectum was normal and revealed no further abnormalities. Recommendations:  1. Repeat colonoscopy: 5 years, due to history of polyps    FINAL DIAGNOSIS:   Gastric biopsies:   Chronic superficial gastritis. Immunohistochemical stain for H.  Pylori is negative. Assessment:     1. Rectal pain    2. Mild chronic gastritis    3.  Irritable bowel syndrome with constipation            Plan:   - Anti-reflux precautions  - Ok for Miralax BID  - Ok for Trulance 3 mg daily prn   - Refer to pelvic floor PT   - Follow up prn or sooner if needed      Orders  Orders Placed This Encounter   Procedures    [employer]

## 2023-08-31 ENCOUNTER — HOSPITAL ENCOUNTER (OUTPATIENT)
Dept: PHYSICAL THERAPY | Age: 68
Setting detail: THERAPIES SERIES
Discharge: HOME | End: 2023-08-31
Payer: MEDICARE

## 2023-08-31 PROCEDURE — 97162 PT EVAL MOD COMPLEX 30 MIN: CPT

## 2023-08-31 NOTE — PROGRESS NOTES
any combination of the following: Current Treatment Recommendations: Pain management, Manual, Neuromuscular re-education, Home exercise program, Safety education & training, Patient/Caregiver education & training, ROM, Positioning, Modalities     Frequency / Duration:  Patient to be seen 2x/week for 6 weeks weeks      Eval Complexity:    Decision Making: Medium Complexity  History: Personal Factors and/or Comorbidities Impacting POC: Medium  History: post menopausal, hysterctomy, child birth x 2  Examination of body system(s) including body structures and functions, activity limitations, and/or participation restrictions: Medium  Exam: tension, pain  Clinical Presentation: Medium    PT Treatment Completed:  Exercises:      Treatment Reasoning    Exercise 1: supine legs up wall  Exercise 2: supine left le knee push isometric into hip ext 5 sec x 5, right knee push isometric hip flex 5 sec x 5  Exercise 3: supine hs 90-90 stretch neutral, ir, er  Exercise 4: supine bilaterl piriformis < and > 90 degrees  Exercise 5: supine happy baby  Exercise 6: seated on stool with forward flex position on mat  Exercise 7: supine stm to right coccygeus and obturator internus with sweeping and trigger point release                          Therapy Time  Individual Time In:    0819     Individual Time Out:   0920   Minutes:  61          Therapist Signature: Tj Wang PT    Date: 9/81/4742     I certify that the above Therapy Services are being furnished while the patient is under my care. I agree with the treatment plan and certify that this therapy is necessary. Physician's Signature:  ___________________________   Date:_______                                                                   MICHAEL Tanner*        Physician Comments: _______________________________________________    Please sign and return to [facility] PHYSICAL THERAPY. Please fax to the location listed below.  [facility] for this referral!    [facility]

## 2023-09-07 ENCOUNTER — HOSPITAL ENCOUNTER (OUTPATIENT)
Dept: PHYSICAL THERAPY | Age: 68
Setting detail: THERAPIES SERIES
Discharge: HOME | End: 2023-09-07
Payer: MEDICARE

## 2023-09-07 PROCEDURE — 97110 THERAPEUTIC EXERCISES: CPT

## 2023-09-07 PROCEDURE — 97140 MANUAL THERAPY 1/> REGIONS: CPT

## 2023-09-07 NOTE — PROGRESS NOTES
Physical Therapy: Daily Note   Patient: Yoly Sun (54 y.o. female)   Examination Date:   Plan of Care/Certification Expiration Date: 23    No data recorded   :  1955 # of Visits since [facility]:   2   MRN: [id number]  CSN: [id number] Start of Care Date:   2023   Insurance: Payor: Esther Williamson / Plan: Don Horne / Product Type: *No Product type* /   Insurance ID: [id number] - (Medicare Managed) Secondary Insurance (if applicable):    Referring Physician: MICHAEL Mayer NP   PCP: Gareth Butler MD Visits to Date/Visits Approved:     No Show/Cancelled Appts:   /       Medical Diagnosis: Other specified diseases of anus and rectum [K62.89] rectal pain K62.89  Treatment Diagnosis: muscle spasm        SUBJECTIVE EXAMINATION   Pain Level: Pain Screening  Patient Currently in Pain: Denies    Patient Comments: Subjective: No pain today. Doing just fine. OBJECTIVE EXAMINATION       TREATMENT     Exercises:      Treatment Reasoning    Exercise 1: supine legs up wall x 3 mins  Exercise 2: supine left le knee push isometric into hip ext 5 sec x 5, right knee push isometric hip flex 5 sec x 5  Exercise 3: supine hs 90-90 stretch neutral, ir, er 3 x 15 sec each  Exercise 4: supine bilaterl piriformis < and > 90 degrees 3 x 15 sec each  Exercise 5: supine happy baby x 1min  Exercise 6: seated on stool with forward flex position on mat x 1 min  Exercise 7: supine stm to right coccygeus and obturator internus with sweeping and trigger point release x 8 mins                                 ASSESSMENT     Assessment: Assessment: Initiated exercise and manual stretching per POC. Patient a bit apprehensive but did well in session. She was able to complete all exercise. Internal manual therapy to right obterator internus and coccygeus. Moderate to obterator and minimal tension in coccygeus. Noted some release with ischemic pressure and sweeping.

## 2023-09-11 ENCOUNTER — HOSPITAL ENCOUNTER (OUTPATIENT)
Dept: PHYSICAL THERAPY | Age: 68
Setting detail: THERAPIES SERIES
Discharge: HOME | End: 2023-09-11
Payer: MEDICARE

## 2023-09-11 PROCEDURE — 97110 THERAPEUTIC EXERCISES: CPT

## 2023-09-11 PROCEDURE — 97140 MANUAL THERAPY 1/> REGIONS: CPT

## 2023-09-11 NOTE — PROGRESS NOTES
Physical Therapy: Daily Note   Patient: Glenn Rojas (29 y.o. female)   Examination Date:   Plan of Care/Certification Expiration Date: 23    No data recorded   :  1955 # of Visits since [facility]:   3   MRN: [id number]  CSN: [id number] Start of Care Date:   2023   Insurance: Payor: Vitaliy Fus / Plan: Rebel Kirkland / Product Type: *No Product type* /   Insurance ID: [id number] - (Medicare Managed) Secondary Insurance (if applicable):    Referring Physician: MICHAEL Prater NP   PCP: Sosa Reilly MD Visits to Date/Visits Approved: 3 / 13    No Show/Cancelled Appts:   /       Medical Diagnosis: Other specified diseases of anus and rectum [K62.89] rectal pain K62.89  Treatment Diagnosis: muscle spasm        SUBJECTIVE EXAMINATION   Pain Level: Pain Screening  Patient Currently in Pain: No    Patient Comments: Subjective: No problems since last session. Can we work on my left side too? I had slight tenderness when she did the eval.  Can you check? OBJECTIVE EXAMINATION     TREATMENT     Exercises:      Treatment Reasoning    Exercise 1: supine legs up wall x 3 mins                HEP issued 2023  Exercise 2: supine left le knee push isometric into hip ext 5 sec x 5, right knee push isometric hip flex 5 sec x 5  Exercise 3: supine hs 90-90 stretch neutral, ir, er 3 x 15 sec each  Exercise 4: supine bilaterl piriformis < and > 90 degrees 3 x 15 sec each  Exercise 5: supine happy baby x 1min  Exercise 6: seated on stool with forward flex position on mat x 1 min  Exercise 7: supine stm to right and left coccygeus and obturator internus with sweeping and trigger point release x 15 mins                               ASSESSMENT     Assessment: Assessment: Patient did well in session. HEP issued 2023. Patient demonstrated all and verbalized understanding. She reported no pain pre or post session.   However, pain rating up to 5/10 during

## 2023-09-14 ENCOUNTER — HOSPITAL ENCOUNTER (OUTPATIENT)
Dept: PHYSICAL THERAPY | Age: 68
Setting detail: THERAPIES SERIES
Discharge: HOME | End: 2023-09-14
Payer: MEDICARE

## 2023-09-14 PROCEDURE — 97140 MANUAL THERAPY 1/> REGIONS: CPT

## 2023-09-14 PROCEDURE — 97110 THERAPEUTIC EXERCISES: CPT

## 2023-09-14 NOTE — PROGRESS NOTES
Physical Therapy: Daily Note   Patient: Yun Rodríguez (78 y.o. female)   Examination Date:   Plan of Care/Certification Expiration Date: 23    No data recorded   :  1955 # of Visits since [facility]:   4   MRN: [id number]  CSN: [id number] Start of Care Date:   2023   Insurance: Payor: Jerry Segovia / Plan: Andria Patterson / Product Type: *No Product type* /   Insurance ID: [id number] - (Medicare Managed) Secondary Insurance (if applicable):    Referring Physician: MICHAEL Clark NP   PCP: Marc Arce MD Visits to Date/Visits Approved:     No Show/Cancelled Appts:   /       Medical Diagnosis: Other specified diseases of anus and rectum [K62.89] rectal pain K62.89  Treatment Diagnosis: muscle spasm        SUBJECTIVE EXAMINATION   Pain Level: Pain Screening  Patient Currently in Pain: No    Patient Comments: Subjective: I feel like the last session may have aggravated my hemrrhoids. But I havent had any bleeding. Just noticed some discomfort in my rectal area. My back hurts after I do legs on the wall. Can I do it differently? TREATMENT     Exercises:      Treatment Reasoning    Exercise 1: supine legs up wall x 3 mins with knees slightly bent               HEP issued 2023  Exercise 2: supine left le knee push isometric into hip ext 5 sec x 5, right knee push isometric hip flex 5 sec x 5  Exercise 3: supine hs 90-90 stretch neutral, ir, er 3 x 15 sec each  Exercise 4: supine bilaterl piriformis < and > 90 degrees 3 x 15 sec each-with towel for figure 4  Exercise 5: supine happy baby x 1min  Exercise 6: seated on stool with forward flex position on mat x 1 min  Exercise 7: supine stm to right and left coccygeus and obturator internus with sweeping and trigger point release x 12 mins                               ASSESSMENT     Assessment: Assessment: Patient did well today. Resumed all previous manual stretches this session.

## 2023-09-18 ENCOUNTER — HOSPITAL ENCOUNTER (OUTPATIENT)
Dept: PHYSICAL THERAPY | Age: 68
Setting detail: THERAPIES SERIES
Discharge: HOME | End: 2023-09-18
Payer: MEDICARE

## 2023-09-18 PROCEDURE — 97110 THERAPEUTIC EXERCISES: CPT

## 2023-09-18 PROCEDURE — 97140 MANUAL THERAPY 1/> REGIONS: CPT

## 2023-09-21 ENCOUNTER — HOSPITAL ENCOUNTER (OUTPATIENT)
Dept: PHYSICAL THERAPY | Age: 68
Setting detail: THERAPIES SERIES
Discharge: HOME | End: 2023-09-21
Payer: MEDICARE

## 2023-09-21 PROCEDURE — 97110 THERAPEUTIC EXERCISES: CPT

## 2023-09-21 NOTE — PROGRESS NOTES
Physical Therapy: Daily Note   Patient: Elena Murphy (89 y.o. female)   Examination Date:   Plan of Care/Certification Expiration Date: 23    No data recorded   :  1955 # of Visits since [facility]:   6   MRN: [id number]  CSN: [id number] Start of Care Date:   2023   Insurance: Payor: Adiel Ordoñez / Plan: Regino Hartford / Product Type: *No Product type* /   Insurance ID: [id number] - (Medicare Managed) Secondary Insurance (if applicable):    Referring Physician: MICHAEL Cardenas NP   PCP: Chelsea Flores MD Visits to Date/Visits Approved:     No Show/Cancelled Appts:   /       Medical Diagnosis: Other specified diseases of anus and rectum [K62.89] rectal pain K62.89  Treatment Diagnosis: muscle spasm        SUBJECTIVE EXAMINATION   Pain Level: Pain Screening  Patient Currently in Pain: No    Patient Comments: Subjective: No episodes with rectal pain since I was here last.    HEP Compliance: Good            TREATMENT     Exercises:      Treatment Reasoning    Exercise 1: supine legs up wall x 3 mins with knees slightly bent               HEP issued 2023  Exercise 2: supine left le knee push isometric into hip ext 5 sec x 5, right knee push isometric hip flex 5 sec x 5  Exercise 3: supine hs 90-90 stretch neutral, ir, er 4 x 15 sec each  Exercise 4: supine bilaterl piriformis < and > 90 degrees 4 x 15 sec each-with towel for figure 4  Exercise 5: supine happy baby x 1 min  Exercise 6: seated on stool with forward flex position on mat x 1 min  Exercise 7: supine stm to right and left coccygeus and obturator internus with sweeping and trigger point release x 12 mins                                 ASSESSMENT     Assessment: Assessment: Patient did well in session. She reports no rectal pain since last episode about 5 days ago. Tension noted in deeper muscle tissues only. Ischemic pressure and sweeping applied as indicated.  Greater tension noted

## 2023-09-25 ENCOUNTER — APPOINTMENT (OUTPATIENT)
Dept: PHYSICAL THERAPY | Age: 68
End: 2023-09-25
Payer: MEDICARE

## 2023-09-27 ENCOUNTER — HOSPITAL ENCOUNTER (OUTPATIENT)
Dept: PHYSICAL THERAPY | Age: 68
Setting detail: THERAPIES SERIES
Discharge: HOME | End: 2023-09-27
Payer: MEDICARE

## 2023-09-27 PROCEDURE — 97140 MANUAL THERAPY 1/> REGIONS: CPT

## 2023-09-27 PROCEDURE — 97110 THERAPEUTIC EXERCISES: CPT

## 2023-09-27 NOTE — PROGRESS NOTES
Physical Therapy: Daily Note/ REASSESSMENT   Patient: Keiko Corbin (81 y.o. female)   Examination Date:   Plan of Care/Certification Expiration Date: 23    No data recorded   :  1955 # of Visits since Carmen Brennan:   7   MRN: [id number]  CSN: [id number] Start of Care Date:   2023   Insurance: Payor: Marek Banks / Plan: Prosper Yin / Product Type: *No Product type* /   Insurance ID: [id number] - (Medicare Managed) Secondary Insurance (if applicable):    Referring Physician: Fazal Burrell, APRN - NP Iram Bhardwaj   PCP: Fatemeh Aguila MD Visits to Date/Visits Approved:     No Show/Cancelled Appts:   /       Medical Diagnosis: Other specified diseases of anus and rectum [K62.89] rectal pain K62.89  Treatment Diagnosis: muscle spasm        SUBJECTIVE EXAMINATION       Patient Comments: Subjective: Patient states she had pain last Thursday night and it woke her up. She says that the pain was more intense than the time before.           OBJECTIVE EXAMINATION   Restrictions:  No data recorded No data recorded No data recorded        TREATMENT     Exercises:      Treatment Reasoning    Exercise 1: supine legs up wall x 3 mins with knees slightly bent               HEP issued 2023  Exercise 2: supine left le knee push isometric into hip ext 5 sec x 5, right knee push isometric hip flex 5 sec x 5- not today  Exercise 3: supine hs 90-90 stretch neutral, ir, er 4 x 15 sec each  Exercise 4: supine bilaterl piriformis < and > 90 degrees 4 x 15 sec each-with towel for figure 4  Exercise 5: supine happy baby x 1 min  Exercise 6: seated on stool with forward flex position on mat x 1 min  Exercise 7: supine stm to right and left coccygeus and obturator internus with sweeping and trigger point release x 12 mins                             ASSESSMENT     Assessment: Assessment: Patient continues to have tension noted to bilateral coccygeus and right obturator internus today, but

## 2023-09-28 ENCOUNTER — APPOINTMENT (OUTPATIENT)
Dept: PHYSICAL THERAPY | Age: 68
End: 2023-09-28
Payer: MEDICARE

## 2023-09-29 ENCOUNTER — HOSPITAL ENCOUNTER (OUTPATIENT)
Dept: PHYSICAL THERAPY | Age: 68
Setting detail: THERAPIES SERIES
Discharge: HOME | End: 2023-09-29
Payer: MEDICARE

## 2023-09-29 PROCEDURE — 97110 THERAPEUTIC EXERCISES: CPT

## 2023-09-29 PROCEDURE — 97140 MANUAL THERAPY 1/> REGIONS: CPT

## 2023-09-29 NOTE — PROGRESS NOTES
Physical Therapy: Daily Note   Patient: Jah Garcia (65 y.o. female)   Examination Date:   Plan of Care/Certification Expiration Date: 23    No data recorded   :  1955 # of Visits since Carmen Brennan:   8   MRN: [id number]  CSN: [id number] Start of Care Date:   2023   Insurance: Payor: Tyronne Hodgkins / Plan: Crystal [employer]s / Product Type: *No Product type* /   Insurance ID: [id number] - (Medicare Managed) Secondary Insurance (if applicable):    Referring Physician: Cherylene Second, APRN - MISYT Mic Alpha   PCP: Ingrid Paulino MD Visits to Date/Visits Approved:     No Show/Cancelled Appts:   /       Medical Diagnosis: Other specified diseases of anus and rectum [K62.89] rectal pain K62.89  Treatment Diagnosis: muscle spasm        SUBJECTIVE EXAMINATION   Pain Level: Pain Screening  Patient Currently in Pain: No    Patient Comments: Subjective: Doing okay today. Im going on vacation soon    HEP Compliance: Fair          TREATMENT     Exercises:      Treatment Reasoning    Exercise 1: supine legs up wall x 3 mins with knees slightly bent               HEP issued 2023  Exercise 2: supine left le knee push isometric into hip ext 5 sec x 5, right knee push isometric hip flex 5 sec x 5- not today  Exercise 3: supine hs 90-90 stretch neutral, ir, er 4 x 15 sec each  Exercise 4: supine bilaterl piriformis < and > 90 degrees 4 x 15 sec each-with towel for figure 4  Exercise 5: supine happy baby x 1 min  Exercise 6: seated on stool with forward flex position on mat x 1 min  Exercise 7: supine stm to right and left coccygeus and obturator internus with sweeping and trigger point release x 12 mins                                 ASSESSMENT     Assessment: Assessment: Patient did well in session. No new reports of rectal pain  She reports she is going out of town on vacation. Advised her to do her ex daily while away.   She relates she is going to get a pelvic wand so she can

## 2023-10-23 ENCOUNTER — HOSPITAL ENCOUNTER (OUTPATIENT)
Dept: PHYSICAL THERAPY | Age: 68
Setting detail: THERAPIES SERIES
Discharge: HOME | End: 2023-10-23
Payer: MEDICARE

## 2023-10-23 PROCEDURE — 97110 THERAPEUTIC EXERCISES: CPT

## 2023-10-23 PROCEDURE — 97140 MANUAL THERAPY 1/> REGIONS: CPT

## 2023-10-23 NOTE — PROGRESS NOTES
obturator internus and coccygeus with decreased tension reported after manual therapy. She rated tenderness of 2-3/10 at start of manual therapy and 0/10 at end of manual therapy.   Body Structures, Functions, Activity Limitations Requiring Skilled Therapeutic Intervention: Increased pain      No data recorded  Therapy Prognosis: Fair       GOALS   Patient Goals : decrease rectal pain  Short Term Goals Completed by 4 Weeks Current Status Goal Status   Patient to have no tendnerness to palpation to right coccygeus and obturator internus with manual palpation 09/27/2023: Patient has min to mod tenderness/tension bilaterally to coccygues muscle today, min tenderness and tension to right obturator internus with manual palpation     Patient to report pain no > 4/10 in rectal region in 1 weeks time 09/27/2023: Patient reports pain was about 7/10 with most recent epidode 6 days ago and it occurred at night, but since she has not had any pain In progress   Patient to be independent with HEP 09/27/2023: Patient reports she has been doing HEP Met                                                               Long Term Goals Completed by 6 Weeks Current Status Goal Status   Patient to report no occurrances of waking with rectal pain in 2 weeks time 09/27/2023: Patient had occurrance of waking with rectal pain 6 days ago with 7/10 rating of pain Not Met   Patient to have equal leg length with no need for correction 2 consecutive visits 09/27/2023: not assessed today Not Met   Patient to demo increased function by scoring 0% impairment on PFIQ-20 09/27/2023: Patient scored 11.81% impairment on PFDI-20 Not Met                                                                TREATMENT PLAN   Plan Frequency: 2x/week  Plan weeks: 6 weeks  Current Treatment Recommendations: Pain management, Manual, Neuromuscular re-education, Home exercise program, Safety education & training, Patient/Caregiver education & training, ROM, Positioning,

## 2023-10-30 ENCOUNTER — HOSPITAL ENCOUNTER (OUTPATIENT)
Dept: PHYSICAL THERAPY | Age: 68
Setting detail: THERAPIES SERIES
Discharge: HOME | End: 2023-10-30
Payer: MEDICARE

## 2023-10-30 PROCEDURE — 97140 MANUAL THERAPY 1/> REGIONS: CPT

## 2023-10-30 PROCEDURE — 97110 THERAPEUTIC EXERCISES: CPT

## 2023-10-30 NOTE — PROGRESS NOTES
[facility], [place]  OUTPATIENT PHYSICAL THERAPY  DISCHARGE SUMMARY    Date: 10/30/2023  Patient Name: Jeremie Chris        MRN: [id number]    ACCOUNT #: [de-identified]  : 1955  (74 y.o.)  Gender: female            Treatment Diagnosis: muscle spasm    Total # of Visits Approved: 13  Total # of Visits to Date: 10    Subjective   Patient states she did have pelvic pain one time last week but she did a few kegels and it went away. Objective  Treatments received include: ther-ex, manual   See objective/subjective data in goals    Assessment  Assessment: Patient did well with tx today with min tension to right obturator internus and left coccygeus, but had decreased tension and tenderness quickly with manual therapy and no tension or tenderness in other areas of pfm's. She reports overall decreased intensity and occurrance of pfm spasms/pain. She has met 4/6 goals with progress toward 2/6 goals and is agreeable with making today her last day and continuing with HEP and therawand at home as needed.            GOALS   Patient Goals : decrease rectal pain  Short Term Goals Completed by 4 Weeks Current Status Goal Status   Patient to have no tendnerness to palpation to right coccygeus and obturator internus with manual palpation 10/30/2023: Patient has min to no tenderness/tension bilaterally to coccygues muscle today, min tenderness and tension to right obturator internus with manual palpation In progress   Patient to report pain no > 4/10 in rectal region in 1 weeks time 10/30/2023: Patient reports pain was about 3/10  in last week Met   Patient to be independent with HEP 10/30/2023: Patient reports she has been doing HEP Met                                                               Long Term Goals Completed by 6 Weeks Current Status Goal Status   Patient to report no occurrances of waking with rectal pain in 2 weeks time 10/30/2023: Patient  states she has only has had one episode of waking with pain in
manual therapy and no tension or tenderness in other areas of pfm's. She reports overall decreased intensity and occurrance of pfm spasms/pain. She has met 4/6 goals with progress toward 2/6 goals and is agreeable with making today her last day and continuing with HEP and therawand at home as needed.   Body Structures, Functions, Activity Limitations Requiring Skilled Therapeutic Intervention: Increased pain      No data recorded  Therapy Prognosis: Fair       GOALS   Patient Goals : decrease rectal pain  Short Term Goals Completed by 4 Weeks Current Status Goal Status   Patient to have no tendnerness to palpation to right coccygeus and obturator internus with manual palpation 10/30/2023: Patient has min to no tenderness/tension bilaterally to coccygues muscle today, min tenderness and tension to right obturator internus with manual palpation In progress   Patient to report pain no > 4/10 in rectal region in 1 weeks time 10/30/2023: Patient reports pain was about 3/10  in last week Met   Patient to be independent with HEP 10/30/2023: Patient reports she has been doing HEP Met                                                               Long Term Goals Completed by 6 Weeks Current Status Goal Status   Patient to report no occurrances of waking with rectal pain in 2 weeks time 10/30/2023: Patient  states she has only has had one episode of waking with pain in last 2 weeks time In progress   Patient to have equal leg length with no need for correction 2 consecutive visits 10/30/2023: patient had equal leg length last 2 visits with no need for correction Met   Patient to demo increased function by scoring 0% impairment on PFIQ-20 10/30/2023: Patient scored 4.17% impairment on PFDI-20 Met                                                                TREATMENT PLAN   Plan Frequency: 2x/week  Plan weeks: 6 weeks  Current Treatment Recommendations: Pain management, Manual, Neuromuscular re-education, Home exercise

## 2024-06-21 ENCOUNTER — HOSPITAL ENCOUNTER (OUTPATIENT)
Dept: WOMENS IMAGING | Age: 69
Discharge: HOME | End: 2024-06-21
Attending: FAMILY MEDICINE
Payer: MEDICARE

## 2024-06-21 DIAGNOSIS — Z78.0 ASYMPTOMATIC MENOPAUSAL STATE: ICD-10-CM

## 2024-06-21 DIAGNOSIS — Z12.31 ENCOUNTER FOR SCREENING MAMMOGRAM FOR MALIGNANT NEOPLASM OF BREAST: ICD-10-CM

## 2024-06-21 DIAGNOSIS — N95.1 SYMPTOMATIC MENOPAUSAL OR FEMALE CLIMACTERIC STATES: ICD-10-CM

## 2024-06-21 PROCEDURE — 77063 BREAST TOMOSYNTHESIS BI: CPT

## 2024-06-21 PROCEDURE — 77080 DXA BONE DENSITY AXIAL: CPT

## 2024-11-22 ENCOUNTER — HOSPITAL ENCOUNTER (OUTPATIENT)
Dept: MRI IMAGING | Age: 69
Discharge: HOME | End: 2024-11-22
Attending: FAMILY MEDICINE
Payer: MEDICARE

## 2024-11-22 DIAGNOSIS — R16.0 HEPATOMEGALY, NOT ELSEWHERE CLASSIFIED: ICD-10-CM

## 2024-11-22 PROCEDURE — 6360000004 HC RX CONTRAST MEDICATION: Performed by: FAMILY MEDICINE

## 2024-11-22 PROCEDURE — 74183 MRI ABD W/O CNTR FLWD CNTR: CPT

## 2024-11-22 PROCEDURE — A9581 GADOXETATE DISODIUM INJ: HCPCS | Performed by: FAMILY MEDICINE

## 2024-11-22 RX ADMIN — GADOXETATE DISODIUM 7 ML: 181.43 INJECTION, SOLUTION INTRAVENOUS at 08:35

## 2025-05-23 ENCOUNTER — TRANSCRIBE ORDERS (OUTPATIENT)
Dept: ADMINISTRATIVE | Age: 70
End: 2025-05-23

## 2025-05-23 DIAGNOSIS — Z12.31 ENCOUNTER FOR SCREENING MAMMOGRAM FOR MALIGNANT NEOPLASM OF BREAST: Primary | ICD-10-CM

## 2025-06-23 ENCOUNTER — HOSPITAL ENCOUNTER (OUTPATIENT)
Dept: WOMENS IMAGING | Age: 70
Discharge: HOME | End: 2025-06-23
Attending: FAMILY MEDICINE
Payer: MEDICARE

## 2025-06-23 VITALS — WEIGHT: 138 LBS | HEIGHT: 65 IN | BODY MASS INDEX: 22.99 KG/M2

## 2025-06-23 DIAGNOSIS — Z12.31 ENCOUNTER FOR SCREENING MAMMOGRAM FOR MALIGNANT NEOPLASM OF BREAST: ICD-10-CM

## 2025-06-23 PROCEDURE — 77063 BREAST TOMOSYNTHESIS BI: CPT

## (undated) DEVICE — ENDO KIT: Brand: MEDLINE INDUSTRIES, INC.

## (undated) DEVICE — FORCEPS BX L240CM DIA2.4MM L NDL RAD JAW 4 133340

## (undated) DEVICE — ENDO KIT,LOURDES HOSPITAL: Brand: MEDLINE INDUSTRIES, INC.

## (undated) DEVICE — BITE BLOCK ENDOSCP AD 60 FR W/ ADJ STRP PLAS GRN BLOX

## (undated) DEVICE — SINGLE PORT MANIFOLD: Brand: NEPTUNE 2

## (undated) DEVICE — FORCEPS BX 240CM 2.4MM L NDL RAD JAW 4 M00513334

## (undated) DEVICE — CANNULA NSL AD L7FT DIV O2 CO2 W/ M LUERLOCK TRMPT CONN